# Patient Record
Sex: MALE | Race: WHITE | NOT HISPANIC OR LATINO | Employment: FULL TIME | URBAN - NONMETROPOLITAN AREA
[De-identification: names, ages, dates, MRNs, and addresses within clinical notes are randomized per-mention and may not be internally consistent; named-entity substitution may affect disease eponyms.]

---

## 2019-01-07 ENCOUNTER — OFFICE VISIT (OUTPATIENT)
Dept: INTERNAL MEDICINE | Facility: CLINIC | Age: 34
End: 2019-01-07

## 2019-01-07 VITALS
DIASTOLIC BLOOD PRESSURE: 86 MMHG | TEMPERATURE: 97.8 F | HEART RATE: 101 BPM | HEIGHT: 67 IN | WEIGHT: 224 LBS | BODY MASS INDEX: 35.16 KG/M2 | SYSTOLIC BLOOD PRESSURE: 120 MMHG | OXYGEN SATURATION: 99 % | RESPIRATION RATE: 15 BRPM

## 2019-01-07 DIAGNOSIS — G89.29 CHRONIC LEFT SHOULDER PAIN: Primary | ICD-10-CM

## 2019-01-07 DIAGNOSIS — M62.838 MUSCLE SPASM: ICD-10-CM

## 2019-01-07 DIAGNOSIS — R20.2 NUMBNESS AND TINGLING IN LEFT ARM: ICD-10-CM

## 2019-01-07 DIAGNOSIS — M25.512 CHRONIC LEFT SHOULDER PAIN: Primary | ICD-10-CM

## 2019-01-07 DIAGNOSIS — M25.619 LIMITED RANGE OF MOTION (ROM) OF SHOULDER: ICD-10-CM

## 2019-01-07 DIAGNOSIS — R20.0 NUMBNESS AND TINGLING IN LEFT ARM: ICD-10-CM

## 2019-01-07 DIAGNOSIS — Q74.0 ANOMALY OF CLAVICLE: ICD-10-CM

## 2019-01-07 PROCEDURE — 99214 OFFICE O/P EST MOD 30 MIN: CPT | Performed by: NURSE PRACTITIONER

## 2019-01-07 RX ORDER — METHOCARBAMOL 500 MG/1
500 TABLET, FILM COATED ORAL NIGHTLY PRN
Qty: 20 TABLET | Refills: 0 | Status: SHIPPED | OUTPATIENT
Start: 2019-01-07 | End: 2019-03-21

## 2019-01-07 NOTE — PROGRESS NOTES
Chief Complaint / Reason:      Chief Complaint   Patient presents with   • Shoulder Pain     since October.        Subjective     HPI  Patient presents today with complaints of left shoulder pain which has been going on since October.  He has had prior x-ray and it was noted he had mild elevation of clavicle.  X-ray was performed on 12/27/18.  Patient does have occasional numbness and tingling.  History taken from: patient    PMH/FH/Social History were reviewed and updated appropriately in the electronic medical record.     Review of Systems:   Review of Systems   Constitutional: Positive for fatigue.   Respiratory: Negative.    Cardiovascular: Negative.    Gastrointestinal: Negative.    Musculoskeletal: Positive for arthralgias, myalgias, neck pain and neck stiffness.        Shoulder pain   Neurological: Positive for weakness and numbness (And tingling).   Psychiatric/Behavioral: Positive for sleep disturbance.     All other systems were reviewed and are negative.  Exceptions are noted in the subjective or above.      Objective     Vital Signs  Vitals:    01/07/19 1548   BP: 120/86   Pulse: 101   Resp: 15   Temp: 97.8 °F (36.6 °C)   SpO2: 99%       Body mass index is 35.08 kg/m².    Physical Exam   Constitutional: He is oriented to person, place, and time. He appears well-developed and well-nourished.   Cardiovascular: Regular rhythm, normal heart sounds and intact distal pulses. Tachycardia present.   Pulmonary/Chest: Effort normal and breath sounds normal. He exhibits no tenderness.   Abdominal: Soft. Bowel sounds are normal.   Musculoskeletal: He exhibits tenderness.        Left shoulder: He exhibits decreased range of motion, tenderness, bony tenderness, pain and spasm. He exhibits normal pulse and normal strength.   Pain and limited ROM in left upper extremity with abduction.  Pain elicited with cervical flexion and lateral flexion to the right.     Neurological: He is alert and oriented to person, place,  and time.   Skin: Skin is warm and dry.   Psychiatric: He has a normal mood and affect. His behavior is normal. Judgment and thought content normal.   Nursing note and vitals reviewed.       Results Review:    I reviewed the patient's previous clinical results.       Medication Review:     Current Outpatient Medications:   •  fluticasone (FLONASE) 50 MCG/ACT nasal spray, 2 sprays into the nostril(s) as directed by provider Daily., Disp: , Rfl:   •  methocarbamol (ROBAXIN) 500 MG tablet, Take 1 tablet by mouth At Night As Needed for Muscle Spasms., Disp: 20 tablet, Rfl: 0    Assessment/Plan   Khang was seen today for shoulder pain.    Diagnoses and all orders for this visit:    Chronic left shoulder pain  -     MRI Shoulder Left Without Contrast  Discussed nonpharmacological interventions to help relieve pain.  May need PT or orthopedic referral  Numbness and tingling in left arm  -     MRI Shoulder Left Without Contrast    Limited range of motion (ROM) of shoulder  -     MRI Shoulder Left Without Contrast    Anomaly of clavicle  -     MRI Shoulder Left Without Contrast    Muscle spasm  -     MRI Shoulder Left Without Contrast  -     methocarbamol (ROBAXIN) 500 MG tablet; Take 1 tablet by mouth At Night As Needed for Muscle Spasms.        Return if symptoms worsen or fail to improve.    Corry Green, APRN  01/07/2019

## 2019-01-18 ENCOUNTER — HOSPITAL ENCOUNTER (OUTPATIENT)
Dept: MRI IMAGING | Facility: HOSPITAL | Age: 34
Discharge: HOME | End: 2019-01-18
Admitting: NURSE PRACTITIONER

## 2019-01-18 PROCEDURE — 73221 MRI JOINT UPR EXTREM W/O DYE: CPT

## 2019-01-21 ENCOUNTER — TELEPHONE (OUTPATIENT)
Dept: INTERNAL MEDICINE | Facility: CLINIC | Age: 34
End: 2019-01-21

## 2019-01-21 DIAGNOSIS — M75.111 PARTIAL TEAR OF RIGHT ROTATOR CUFF: Primary | ICD-10-CM

## 2019-01-21 DIAGNOSIS — M67.813 TENDINOSIS OF RIGHT SHOULDER: ICD-10-CM

## 2019-01-21 NOTE — TELEPHONE ENCOUNTER
Patient let vm at 8:26am stating that he had missed a call from Manisha regarding getting scheduled with Ortho and he is requesting another return call. Please advise. Confirmed call back is [phone]

## 2019-01-21 NOTE — PROGRESS NOTES
Please contact patient and let him know results and tell him I recommend referral to ortho. I sent him a letter with the report.

## 2019-01-22 ENCOUNTER — OFFICE VISIT (OUTPATIENT)
Dept: ORTHOPEDIC SURGERY | Facility: CLINIC | Age: 34
End: 2019-01-22

## 2019-01-22 VITALS — RESPIRATION RATE: 18 BRPM | HEIGHT: 67 IN | WEIGHT: 217 LBS | BODY MASS INDEX: 34.06 KG/M2

## 2019-01-22 DIAGNOSIS — IMO0002 BURSITIS/TENDONITIS, SHOULDER: ICD-10-CM

## 2019-01-22 DIAGNOSIS — M25.511 ARTHRALGIA OF RIGHT SHOULDER REGION: Primary | ICD-10-CM

## 2019-01-22 DIAGNOSIS — M54.12 CERVICAL RADICULOPATHY AT C6: ICD-10-CM

## 2019-01-22 PROCEDURE — 20610 DRAIN/INJ JOINT/BURSA W/O US: CPT | Performed by: ORTHOPAEDIC SURGERY

## 2019-01-22 PROCEDURE — 99204 OFFICE O/P NEW MOD 45 MIN: CPT | Performed by: ORTHOPAEDIC SURGERY

## 2019-01-22 RX ORDER — TRIAMCINOLONE ACETONIDE 40 MG/ML
40 INJECTION, SUSPENSION INTRA-ARTICULAR; INTRAMUSCULAR
Status: COMPLETED | OUTPATIENT
Start: 2019-01-22 | End: 2019-01-22

## 2019-01-22 RX ORDER — MELOXICAM 15 MG/1
15 TABLET ORAL DAILY
Qty: 30 TABLET | Refills: 1 | Status: SHIPPED | OUTPATIENT
Start: 2019-01-22 | End: 2019-08-12

## 2019-01-22 RX ORDER — LIDOCAINE HYDROCHLORIDE 10 MG/ML
2 INJECTION, SOLUTION EPIDURAL; INFILTRATION; INTRACAUDAL; PERINEURAL
Status: COMPLETED | OUTPATIENT
Start: 2019-01-22 | End: 2019-01-22

## 2019-01-22 RX ADMIN — TRIAMCINOLONE ACETONIDE 40 MG: 40 INJECTION, SUSPENSION INTRA-ARTICULAR; INTRAMUSCULAR at 11:18

## 2019-01-22 RX ADMIN — LIDOCAINE HYDROCHLORIDE 2 ML: 10 INJECTION, SOLUTION EPIDURAL; INFILTRATION; INTRACAUDAL; PERINEURAL at 11:18

## 2019-01-22 NOTE — PROGRESS NOTES
Subjective   Patient ID: Khang Caruso is a 33 y.o. male  Pain of the Left Shoulder (Patient denies any injury to the shoulder, he states he woke up in October 2018 with pain and the severity has just increased. He states he has had 2 rounds of a medrol dose jacques and still having pain. 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  X-rays of the neck and shoulder were unremarkable, MRI of the shoulder confirm partial tearing supraspinatus with a type III acromion and impingement, MRI of the cervical spine was denied for that reason he is referred here.    Gives  no history of shoulder trauma dislocation throwing injury or prior instability.    At times pain does radiate down the elbow laterally and even into the forearm and wrist area with soreness in the hand even without moving her shoulder.    Initial stiffness in the neck has improved but neck spasm and guarding and tightness continues.    Review of Systems   Constitutional: Negative for fever.   HENT: Negative for voice change.    Eyes: Negative for visual disturbance.   Respiratory: Negative for shortness of breath.    Cardiovascular: Negative for chest pain.   Gastrointestinal: Negative for abdominal distention and abdominal pain.   Genitourinary: Negative for dysuria.   Musculoskeletal: Positive for arthralgias. Negative for gait problem and joint swelling.   Skin: Negative for rash.   Neurological: Negative for speech difficulty.   Hematological: Does not bruise/bleed easily.   Psychiatric/Behavioral: Negative for confusion.       History reviewed. No pertinent past medical history.     Past Surgical History:  "  Procedure Laterality Date   • NASAL TURBINATE REDUCTION         Family History   Problem Relation Age of Onset   • Arthritis Mother    • Heart disease Maternal Grandfather    • Diabetes Paternal Grandmother        Social History     Socioeconomic History   • Marital status:      Spouse name: Not on file   • Number of children: Not on file   • Years of education: Not on file   • Highest education level: Not on file   Social Needs   • Financial resource strain: Not on file   • Food insecurity - worry: Not on file   • Food insecurity - inability: Not on file   • Transportation needs - medical: Not on file   • Transportation needs - non-medical: Not on file   Occupational History   • Not on file   Tobacco Use   • Smoking status: Never Smoker   • Smokeless tobacco: Never Used   Substance and Sexual Activity   • Alcohol use: No   • Drug use: No   • Sexual activity: Defer   Other Topics Concern   • Not on file   Social History Narrative   • Not on file       I have reviewed all of the above social hx, family hx, surgical hx, medications, allergies & ROS and confirm that it is accurate.    No Known Allergies      Current Outpatient Medications:   •  fluticasone (FLONASE) 50 MCG/ACT nasal spray, 2 sprays into the nostril(s) as directed by provider Daily., Disp: , Rfl:   •  methocarbamol (ROBAXIN) 500 MG tablet, Take 1 tablet by mouth At Night As Needed for Muscle Spasms., Disp: 20 tablet, Rfl: 0    Objective   Resp 18   Ht 170.2 cm (67\")   Wt 98.4 kg (217 lb)   BMI 33.99 kg/m²    Physical Exam  Constitutional: Patient is oriented to person, place, and time. Patient appears well-developed and well-nourished.   HENT:Head: Normocephalic and atraumatic.   Eyes: EOM are normal. Pupils are equal, round, and reactive to light.   Neck: Normal range of motion. Neck supple.   Cardiovascular: Normal rate.    Pulmonary/Chest: Effort normal and breath sounds normal.   Abdominal: Soft.   Neurological: Patient is alert and " oriented to person, place, and time.   Skin: Skin is warm and dry.   Psychiatric: Patient has a normal mood and affect.   Nursing note and vitals reviewed.       [unfilled]   Cervical spine with limited less than rotation extension, positive Spurling finding for reduction of lateral left arm pain and burning with extension left-sided rotation, left trapezius tender with spasm, acromio- clavicular joint nontender.    Positive impingement sign positive tenderness subacromial bursa and anterolateral acromial region, no tenderness proximal biceps tendon, forward elevation limited in 90° with pain, abduction limited in 90° with pain full internal/external rotation normal strength    Assessment/Plan   Review of Radiographic Studies:    Radiographic images today of affected area I personally viewed and showed no sign of acute fracture or dislocation.      Large Joint Arthrocentesis: L subacromial bursa  Date/Time: 1/22/2019 11:18 AM  Consent given by: patient  Site marked: site marked  Timeout: Immediately prior to procedure a time out was called to verify the correct patient, procedure, equipment, support staff and site/side marked as required   Supporting Documentation  Indications: pain   Procedure Details  Location: shoulder - L subacromial bursa  Preparation: Patient was prepped and draped in the usual sterile fashion  Needle size: 22 G  Medications administered: 2 mL lidocaine PF 1% 1 %; 40 mg triamcinolone acetonide 40 MG/ML  Patient tolerance: patient tolerated the procedure well with no immediate complications           Khnag was seen today for pain.    Diagnoses and all orders for this visit:    Arthralgia of right shoulder region  -     XR Shoulder 2+ View Right    Bursitis/tendonitis, shoulder    Cervical radiculopathy at C6  -     MRI Cervical Spine Without Contrast       Physical therapy referral given      Recommendations/Plan:   Exercise, medications, injections, other patient advice, and return  appointment as noted.    Patient agreeable to call or return sooner for any concerns.             Impression:  Left nondominant shoulder impingement type III acromion partial tearing supraspinatus, left-sided neck pain and arm pain cervical radiculopathy rule out cervical disc herniation  Plan:  Therapy referral to treat both cervical and shoulder regions, meloxicam trial,  MR cervical spine,

## 2019-01-23 ENCOUNTER — HOSPITAL ENCOUNTER (OUTPATIENT)
Dept: MRI IMAGING | Facility: HOSPITAL | Age: 34
Discharge: HOME | End: 2019-01-23
Attending: ORTHOPAEDIC SURGERY | Admitting: ORTHOPAEDIC SURGERY

## 2019-01-23 PROCEDURE — 72141 MRI NECK SPINE W/O DYE: CPT

## 2019-02-26 ENCOUNTER — OFFICE VISIT (OUTPATIENT)
Dept: ORTHOPEDIC SURGERY | Facility: CLINIC | Age: 34
End: 2019-02-26

## 2019-02-26 VITALS — BODY MASS INDEX: 31.23 KG/M2 | WEIGHT: 199 LBS | RESPIRATION RATE: 18 BRPM | HEIGHT: 67 IN

## 2019-02-26 DIAGNOSIS — M54.12 CERVICAL RADICULOPATHY: ICD-10-CM

## 2019-02-26 DIAGNOSIS — IMO0002 BURSITIS/TENDONITIS, SHOULDER: Primary | ICD-10-CM

## 2019-02-26 PROCEDURE — 99214 OFFICE O/P EST MOD 30 MIN: CPT | Performed by: ORTHOPAEDIC SURGERY

## 2019-02-26 NOTE — PROGRESS NOTES
Subjective   Patient ID: Khang Caruso is a 33 y.o. male  Follow-up and Pain of the Cervical Spine (Patient is here today for MRI results on his left shoulder and c-spine. He state the shoulder is doing better but certain movements like to much extension cause pain.) and Follow-up and Pain of the Left Shoulder             History of Present Illness  Right-hand-dominant  much improved left shoulder pain still bothers her more with axial neck pain and stiffness in the neck with left-sided rotation and extension recent MR showed C3-4 C4-5 bulging disks with foraminal encroachment, denies paresthesias in the lower arms or weakness in the hands.  He is very comfortable with range of motion of the shoulder today and does not wish to proceed with surgical treatment for  shoulder condition at this time.      Review of Systems   Constitutional: Negative for fever.   HENT: Negative for voice change.    Eyes: Negative for visual disturbance.   Respiratory: Negative for shortness of breath.    Cardiovascular: Negative for chest pain.   Gastrointestinal: Negative for abdominal distention and abdominal pain.   Genitourinary: Negative for dysuria.   Musculoskeletal: Positive for arthralgias. Negative for gait problem and joint swelling.   Skin: Negative for rash.   Neurological: Negative for speech difficulty.   Hematological: Does not bruise/bleed easily.   Psychiatric/Behavioral: Negative for confusion.       History reviewed. No pertinent past medical history.     Past Surgical History:   Procedure Laterality Date   • NASAL TURBINATE REDUCTION         Family History   Problem Relation Age of Onset   • Arthritis Mother    • Heart disease Maternal Grandfather    • Diabetes Paternal Grandmother        Social History     Socioeconomic History   • Marital status:      Spouse name: Not on file   • Number of children: Not on file   • Years of education: Not on file   • Highest education level: Not on file  "  Social Needs   • Financial resource strain: Not on file   • Food insecurity - worry: Not on file   • Food insecurity - inability: Not on file   • Transportation needs - medical: Not on file   • Transportation needs - non-medical: Not on file   Occupational History   • Not on file   Tobacco Use   • Smoking status: Never Smoker   • Smokeless tobacco: Never Used   Substance and Sexual Activity   • Alcohol use: No   • Drug use: No   • Sexual activity: Defer   Other Topics Concern   • Not on file   Social History Narrative   • Not on file       I have reviewed all of the above social hx, family hx, surgical hx, medications, allergies & ROS and confirm that it is accurate.    No Known Allergies      Current Outpatient Medications:   •  fluticasone (FLONASE) 50 MCG/ACT nasal spray, 2 sprays into the nostril(s) as directed by provider Daily., Disp: , Rfl:   •  meloxicam (MOBIC) 15 MG tablet, Take 1 tablet by mouth Daily., Disp: 30 tablet, Rfl: 1  •  methocarbamol (ROBAXIN) 500 MG tablet, Take 1 tablet by mouth At Night As Needed for Muscle Spasms., Disp: 20 tablet, Rfl: 0    Objective   Resp 18   Ht 170.2 cm (67\")   Wt 90.3 kg (199 lb)   BMI 31.17 kg/m²    Physical Exam  Constitutional: Patient is oriented to person, place, and time. Patient appears well-developed and well-nourished.   HENT:Head: Normocephalic and atraumatic.   Eyes: EOM are normal. Pupils are equal, round, and reactive to light.   Neck: Normal range of motion. Neck supple.   Cardiovascular: Normal rate.    Pulmonary/Chest: Effort normal and breath sounds normal.   Abdominal: Soft.   Neurological: Patient is alert and oriented to person, place, and time.   Skin: Skin is warm and dry.   Psychiatric: Patient has a normal mood and affect.   Nursing note and vitals reviewed.       [unfilled]   Cervical spine: Axial spasm and guarding with left-sided rotation extension painful and positive for neck pain but negative for lower arm paresthesias some " tenderness in the paracervical spine noted.    Left shoulder: Full range of motion minimal signs of impingement no weakness no instability atrophy or focal tenderness at the acromial clavicular or anterior biceps tendon.    Assessment/Plan   Review of Radiographic Studies:    No new imaging done today.      Procedures     Khang was seen today for follow-up, pain, follow-up and pain.    Diagnoses and all orders for this visit:    Bursitis/tendonitis, shoulder    Cervical radiculopathy       Orthopedic activities reviewed and patient expressed appreciation      Recommendations/Plan:   Work/Activity Status: May perform usual activities as tolerated    Patient agreeable to call or return sooner for any concerns.         Reviewed his MR cervical spine showing C3-4 and C4-5 bulging disks with slight bilateral neuroforaminal encroachment and reviewed his MR shoulder showing mild impingement with partial cuff tearing.  Given his minimal symptoms in the shoulder and predominance of axial cervical symptoms further evaluation and treatment will be arranged with neurosurgery to discuss pain management and treatment for his cervical pathology.      If his shoulder condition worsens in the future he is a candidate for arthroscopic surgery for decompression and further arthroscopic evaluation of his partial rotator cuff tear.    Impression:  C3-4, C4-5 bulging disks of the left-sided neck pain loss of cervical motion, impingement syndrome left shoulder much improved partial tearing full range of motion  Plan:  Neurosurgery consult for further treatment for cervical spine, orthopedic follow-up in 4-6 months if left shoulder pain continues for discussion for arthroscopic treatment

## 2019-03-12 ENCOUNTER — OFFICE VISIT (OUTPATIENT)
Dept: NEUROSURGERY | Facility: CLINIC | Age: 34
End: 2019-03-12

## 2019-03-12 VITALS — HEIGHT: 67 IN | BODY MASS INDEX: 31.23 KG/M2 | WEIGHT: 199 LBS

## 2019-03-12 DIAGNOSIS — M50.30 BULGING OF CERVICAL INTERVERTEBRAL DISC: Primary | ICD-10-CM

## 2019-03-12 PROCEDURE — 99243 OFF/OP CNSLTJ NEW/EST LOW 30: CPT | Performed by: NEUROLOGICAL SURGERY

## 2019-03-12 NOTE — PROGRESS NOTES
Subjective   Patient ID: Khang Caruso is a 34 y.o. 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  Pain has radiated to his left arm and hand.  He saw Dr. Mccauley about his left shoulder and had an injection in his shoulder as well as physical therapy and the shoulder pain is much better.  Unfortunately he still having the neck pain and stiffness, a nonradicular type of pain, that is worse with sleeping in certain positions or stretching.  He has not had to be off from work.    Review of Radiographic Studies:  Cervical MRI scan was done on 1/23/2019 and shows a degenerative bilateral disc bulge at C3-4 as the primary issue, and a lesser degree of the same findings C4-5.  There is no evidence of nerve root compression causing a radiculopathy, instability, or    The following portions of the patient's history were reviewed, updated as appropriate and approved: allergies, current medications, past family history, past medical history, past social history, past surgical history, review of systems and problem list.  Review of Systems   Constitutional: Negative for activity change, appetite change, chills, diaphoresis, fatigue, fever and unexpected weight change.   HENT: Negative for congestion, dental problem, drooling, ear discharge, ear pain, facial swelling, hearing loss, mouth sores, nosebleeds, postnasal drip, rhinorrhea, sinus pressure, sneezing, sore throat, tinnitus, trouble swallowing and voice change.    Eyes: Negative for photophobia, pain, discharge, redness, itching and visual disturbance.   Respiratory: Negative for apnea, cough, choking, chest tightness, shortness of breath, wheezing and stridor.     Cardiovascular: Negative for chest pain, palpitations and leg swelling.   Gastrointestinal: Negative for abdominal distention, abdominal pain, anal bleeding, blood in stool, constipation, diarrhea, nausea, rectal pain and vomiting.   Endocrine: Negative for cold intolerance, heat intolerance, polydipsia, polyphagia and polyuria.   Genitourinary: Negative for decreased urine volume, difficulty urinating, dysuria, enuresis, flank pain, frequency, genital sores, hematuria and urgency.   Musculoskeletal: Positive for arthralgias, myalgias, neck pain and neck stiffness. Negative for back pain, gait problem and joint swelling.   Skin: Negative for color change, pallor, rash and wound.   Allergic/Immunologic: Negative for environmental allergies, food allergies and immunocompromised state.   Neurological: Positive for headaches. Negative for dizziness, tremors, seizures, syncope, facial asymmetry, speech difficulty, weakness, light-headedness and numbness.   Hematological: Negative for adenopathy. Does not bruise/bleed easily.   Psychiatric/Behavioral: Negative for agitation, behavioral problems, confusion, decreased concentration, dysphoric mood, hallucinations, self-injury, sleep disturbance and suicidal ideas. The patient is not nervous/anxious and is not hyperactive.        Objective     NEUROLOGICAL EXAMINATION:      MENTAL STATUS:  Alert and oriented.  Speech intact.  Recent and remote memory intact.      CRANIAL NERVES:  Cranial nerve II:  Visual fields are full.  Cranial nerves III, IV and VI:  PERRLADC.  Extraocular movements are intact.  Nystagmus is not present.  Cranial nerve V:  Facial sensation is intact.  Cranial nerve VII:  Muscles of facial expression reveal no asymmetry.  Cranial nerve VIII:  Hearing is intact.  Cranial nerves IX and X:  Palate elevates symmetrically.  Cranial nerve XI:  Shoulder shrug is intact.  Cranial nerve XII:  Tongue is midline without evidence of atrophy or  fasciculation.    MUSCULOSKELETAL: Left shoulder range of motion intact.  Cervical range of motion intact.    MOTOR: Intact strength in the deltoid, biceps, triceps, and  bilaterally.    SENSATION: No radicular sensory loss in the upper extremities.    REFLEXES:  DTR 2+ and equal in biceps and triceps bilaterally.    Assessment   Cervical degenerative disc with cervical pain syndrome C3-4, C4-5.  No radiculopathy, myelopathy, or instability.       Plan   Cervical spine physical therapy.  Referral for cervical epidural injection, particularly since he had such success with steroid injection in his shoulder.  Surgery is not necessary.       Michael Johnson MD

## 2019-03-13 ENCOUNTER — TELEPHONE (OUTPATIENT)
Dept: PAIN MEDICINE | Facility: CLINIC | Age: 34
End: 2019-03-13

## 2019-03-13 NOTE — TELEPHONE ENCOUNTER
[facility] Report #[id number]    MRI CERVICAL SPINE 01/23/19    FINDINGS: The cervical vertebral bodies maintain a normal height,  alignment and signal intensity. The posterior elements are intact.      At the C3/4 level there is a broad-based disc bulge which produces mild  bilateral neural foraminal narrowing. There is no significant central  stenosis.     At the C4/5 level there is a broad-based disc bulge, however there is no  significant spinal or neural foraminal stenosis.      The remainder of the intervertebral disc are preserved.     The cervical portions of the spinal cord maintains a normal caliber and  signal intensity. The visualized posterior fossa is normal. The  paraspinal soft tissues are unremarkable.    XRAY SHOULDER 01/22/19    3 views left shoulder indication pain, no comparison to prior films, impression: No sign of fracture or arthritis or significant soft tissue swelling    MRI SHOULDER 01/18/19    FINDINGS:   There is tendinosis of the supraspinatus and infraspinatus without  full-thickness tear. There may be partial tear along the undersurface of  the anterior supraspinatus. Partial tear is noted of the cranial-most  fibers of the subscapularis. Teres minor is intact.     The biceps tendon follows its normal intra-articular course and is  located within the bicipital groove. No labral tear is identified.      Mild subacromial bursitis.      There is a hooked acromion, type III.      Mild edematous change of the acromioclavicular joint.

## 2019-03-19 NOTE — PROGRESS NOTES
"Chief Complaint: \"Pain in my neck and left shoulder.\"        History of Present Illness:   Patient: Mr. Khang Caruso, 34 y.o. male   Referring physician: Dr. Michael Johnson   Reason for referral: Consultation for intractable chronic neck and left shoulder pain.   Pain history: Patient reports a 5-month history of neck pain and stiffness and pain in his left shoulder, which began without incident. Pain has progressed in intensity over the past 3 months. Patient underwent consultation with Dr. Mccauley about his left shoulder, and had a left shoulder injection along with physical therapy. The shoulder pain has improved since then. Unfortunately, patient continued experiencing neck pain and stiffness. MRI of the cervical spine on 01/23/2019, revealed degenerative disc bulge at C3-C4 and to a lesser degree at C4-C5. No evidence of nerve root compression. MRI of the left shoulder on 01/18/2019, revealed tendinosis of the supraspinatus and infraspinatus without full-thickness tear. Partial tear along the undersurface of the anterior supraspinatus. Partial tear of the cranial-most fibers of the subscapularis. No labral tear was identified. Mild subacromial bursitis. There is a hooked acromion, type III. Mild edematous change of the acromioclavicular joint.  Pain description: constant pain with intermittent exacerbation, described as stiffness, burning and sharp sensation.   Radiation of pain: The pain radiates into the posterior aspect of the shoulder, and into the bilateral occipital region  Pain intensity today: 5/10  Average pain intensity last week: 5/10  Pain intensity ranges from: 3/10 to 8/10  Aggravating factors: Pain increases with flexion and extension of the cervical spine  Alleviating factors: Pain decreases with none    Associated symptoms:   Patient denies pain, numbness or weakness in the upper extremities  Patient denies any new bladder or bowel problems.   Patient denies difficulties with his balance or recent " falls.      Review of previous therapies and additional medical records:  Khang Caruso has already failed the following measures, including:   Conservative measures: analgesics and physical therapy   Interventional measures: None  Surgical measures: No history of cervical spine or shoulder surgery   Khang Caruso underwent neurosurgical consultation with Dr. Michael Johnson on 03/12/2019, and was found not to be a surgical candidate.  Khang Caruso is a healthy adult other than his chronic pain.  In terms of current analgesics, Khang Caruso takes: meloxicam or Tylenol  I have reviewed [facility] Report #[id number] consistent to medication reconciliation.     Global Pain Scale 03-21 2019                 Pain  14                 Feelings  4                 Clinical outcomes  7                 Activities  6                 GPS Total:  31                    Review of Diagnostic Studies:    MRI CERVICAL SPINE 01/23/2019: The cervical vertebral bodies maintain a normal height, alignment and signal intensity. The posterior elements are intact. The remainder of the intervertebral disc are preserved. The cervical portions of the spinal cord maintains a normal caliber and signal intensity. The visualized posterior fossa is normal. The paraspinal soft tissues are unremarkable.  C3-C4: broad-based disc bulge which produces mild bilateral neural foraminal narrowing. There is no significant central stenosis.  C4-C5: broad-based disc bulge, however there is no significant spinal or neural foraminal stenosis.   X-RAY SHOULDER 01/22/2019: 3 views left shoulder indication pain, no comparison to prior films, impression: No sign of fracture or arthritis or significant soft tissue swelling  MRI SHOULDER 01/18/19: There is tendinosis of the supraspinatus and infraspinatus without full-thickness tear. Partial tear along the undersurface of the anterior supraspinatus. Partial tear is noted of the cranial-most fibers of the subscapularis.  "Teres minor is intact. The biceps tendon follows its normal intra-articular course and is located within the bicipital groove. No labral tear is identified. Mild subacromial bursitis. There is a hooked acromion, type III. Mild edematous change of the acromioclavicular joint.    Review of Systems   HENT: Positive for congestion.    Musculoskeletal: Positive for back pain, neck pain and neck stiffness.   All other systems reviewed and are negative.        Patient Active Problem List   Diagnosis   • Back pain   • Bulging of cervical intervertebral disc   • DDD (degenerative disc disease), cervical   • Foraminal stenosis of cervical region   • Incomplete tear of left rotator cuff   • Cervical spondylosis without myelopathy   • Myofascial pain   • Mild obesity   • Insomnia     History reviewed. No pertinent past medical history.      Past Surgical History:   Procedure Laterality Date   • NASAL TURBINATE REDUCTION           Family History   Problem Relation Age of Onset   • Arthritis Mother    • Heart disease Maternal Grandfather    • Diabetes Paternal Grandmother          Social History     Socioeconomic History   • Marital status:      Spouse name: Not on file   • Number of children: Not on file   • Years of education: Not on file   • Highest education level: Not on file   Tobacco Use   • Smoking status: Never Smoker   • Smokeless tobacco: Never Used   Substance and Sexual Activity   • Alcohol use: No   • Drug use: No   • Sexual activity: Defer           Current Outpatient Medications:   •  fluticasone (FLONASE) 50 MCG/ACT nasal spray, 2 sprays into the nostril(s) as directed by provider Daily., Disp: , Rfl:   •  meloxicam (MOBIC) 15 MG tablet, Take 1 tablet by mouth Daily., Disp: 30 tablet, Rfl: 1      No Known Allergies      /80   Pulse 77   Temp 96.8 °F (36 °C) (Temporal)   Resp 18   Ht 170.2 cm (67\")   Wt 92.9 kg (204 lb 12.8 oz)   SpO2 98%   BMI 32.08 kg/m²       Physical Exam:  Constitutional: " Patient is oriented to person, place, and time. Patient appears well-developed and well-nourished.   HEENT: Head: Normocephalic and atraumatic. Eyes: Conjunctivae and lids are normal. Pupils: Equal, round, reactive to light.   Neck: Trachea normal. Neck supple. No JVD present.   Lymphatic: No cervical adenopathy  Pulmonary Respiratory effort: No increased work of breathing or signs of respiratory distress. Auscultation of lungs: Clear to auscultation.   Cardiovascular Auscultation of heart: Normal rate and rhythm, normal S1 and S2, no murmurs.   Musculoskeletal   Gait and station: Gait evaluation demonstrated a normal gait   Cervical spine: Passive and active range of motion are limited secondary to pain. Flexion, lateral flexion, and rotation of the cervical spine increased and reproduced pain. Cervical facet joint loading maneuvers are positive.  Muscles: Presence of active trigger points at the levator scapulae   Shoulders: The range of motion of the glenohumeral joints is full and without pain. Rotator cuff strength is 5/5, except left supraspinatus 4+/5.   Neurological: Patient is alert and oriented to person, place, and time. Speech: speech is normal. Cortical function: Normal mental status.   Cranial nerves: Cranial nerves 2-12 intact.   Reflex Scores:  Right brachioradialis: 2+  Left brachioradialis: 2+  Right biceps: 2+  Left biceps: 2+  Right triceps: 2+  Left triceps: 2+  Right patellar: 2+  Left patellar: 2+  Right Achilles: 2+  Left Achilles: 2+  Motor strength: 5/5  Motor Tone: normal tone.   Involuntary movements: none.   Superficial/Primitive Reflexes: primitive reflexes were absent.   Right Diaz: absent  Left Diaz: absent  Right ankle clonus: absent  Left ankle clonus: absent   Spurling sign is negative. Neck tornado test is negative. Lhermitte sign is negative. Negative long tract signs.   Sensation: No sensory loss. Sensory exam: intact to light touch, intact pain and temperature  sensation, intact vibration sensation and normal proprioception.   Coordination: Normal finger to nose and heel to shin. Normal balance and negative Romberg's sign   Skin and subcutaneous tissue: Skin is warm and intact. No rash noted. No cyanosis.   Psychiatric: Judgment and insight: Normal. Orientation to person, place and time: Normal. Recent and remote memory: Intact. Mood and affect: Normal.     ASSESSMENT:   1. Cervical spondylosis without myelopathy    2. DDD (degenerative disc disease), cervical    3. Foraminal stenosis of cervical region    4. Incomplete tear of left rotator cuff    5. Myofascial pain    6. Mild obesity    7. Insomnia, unspecified type        PLAN/MEDICAL DECISION MAKING: I had a lengthy conversation with . Khang Caruso regarding his chronic pain condition and potential therapeutic options including risks, benefits, alternative therapies, to name a few. Patient has failed to obtain pain relief with conservative measures, as referenced above. Patient has been found not to be a surgical candidate for his cervical spine condition. I have reviewed all available patient's medical records as well as previous therapies as referenced above.  Therefore, I have proposed the following plan:  1. Diagnostic studies: CBC with differential, ESR, CRP, Cyclic anticitrulinated peptide, LINNETTE panel, Rheumatoid factor, HLA-B27, CMP, and Uric acid  2. Pharmacological measures: Reviewed. Discussed.   A. Patient takes meloxicam or Tylenol  B. Trial with Rheumate one tablet twice daily (samples)  C. Trial with nortriptyline 10 mg 1-2 tablets at bedtime  D. Trial with prilocaine 2%, lidocaine 10%, imipramine 3%, capsaicin 0.001% and mannitol 20%  cream, apply 1 to 2 grams of cream to the affected areas every 4 to 6 hours as needed  3. Interventional pain management measures: Patient will be scheduled for diagnostic and therapeutic cervical facet joint injections: bilateral C4-C5, bilateral C5-C6. If patient  fails to obtain sustained pain relief, then, we will proceed with diagnostic bilateral cervical medial branch blocks at C4, C5, C6; for bilateral cervical facet joints at C4-C5, C5-C6, to clarify the origin of chronic refractory mechanical/axial cervicalgia. If patient experiences more than 80% pain relief along with significant improvement in the range of motion of the cervical spine, then, patient will be scheduled for a second set of diagnostic bilateral cervical medial branch blocks, to then, proceed with bilateral cervical medial branch rhizotomies. Otherwise, we may consider cervical epidural steroid injection by interlaminar approach. We may repeat another epidural depending on patient's outcome.   4. Long-term rehabilitation efforts:  A. The patient does not have a history of falls. I did complete a risk assessment for falls.   B. Patient will start a comprehensive physical therapy program for therapeutic exercise, upper body strengthening/posture correction, neurodynamics, myofascial release, cupping and dry needling   C. Start an exercise program such as yoga, swimming and daily walks for fitness  D. Trial with TENS unit/interferential unit  E. Contrast therapy: Apply ice-packs for 15-20 minutes, followed by heating pads for 15-20 minutes to affected area   F. Patient has declined a referral to [facility] at this time  5. The patient has been instructed to contact my office with any questions or difficulties. The patient understands the plan and agrees to proceed accordingly.       Patient Care Team:  Corry Green APRN as PCP - General (Nurse Practitioner)  Andrea Mccauley MD as Consulting Physician (Orthopedic Surgery)  Jose Reed MD as Consulting Physician (Otolaryngology)  Michael Johnson MD as Consulting Physician (Neurosurgery)     No orders of the defined types were placed in this encounter.        No future appointments.      Jerry Kincaid MD      EMR Dragon/Transcription disclaimer:  Much of this encounter note is an electronic transcription of spoken language to printed text. Electronic transcription of spoken language may permit erroneous, or at times, nonsensical words or phrases to be inadvertently transcribed. Although I have reviewed the note for such errors, some may still exist.

## 2019-03-20 PROBLEM — M48.02 FORAMINAL STENOSIS OF CERVICAL REGION: Status: ACTIVE | Noted: 2019-03-20

## 2019-03-20 PROBLEM — M50.30 DDD (DEGENERATIVE DISC DISEASE), CERVICAL: Status: ACTIVE | Noted: 2019-03-20

## 2019-03-20 PROBLEM — M75.112 INCOMPLETE TEAR OF LEFT ROTATOR CUFF: Status: ACTIVE | Noted: 2019-03-20

## 2019-03-21 ENCOUNTER — OFFICE VISIT (OUTPATIENT)
Dept: PAIN MEDICINE | Facility: CLINIC | Age: 34
End: 2019-03-21

## 2019-03-21 VITALS
DIASTOLIC BLOOD PRESSURE: 80 MMHG | RESPIRATION RATE: 18 BRPM | SYSTOLIC BLOOD PRESSURE: 110 MMHG | BODY MASS INDEX: 32.15 KG/M2 | HEART RATE: 77 BPM | WEIGHT: 204.8 LBS | TEMPERATURE: 96.8 F | OXYGEN SATURATION: 98 % | HEIGHT: 67 IN

## 2019-03-21 DIAGNOSIS — M79.18 MYOFASCIAL PAIN: ICD-10-CM

## 2019-03-21 DIAGNOSIS — M75.112 INCOMPLETE TEAR OF LEFT ROTATOR CUFF: ICD-10-CM

## 2019-03-21 DIAGNOSIS — G47.00 INSOMNIA, UNSPECIFIED TYPE: ICD-10-CM

## 2019-03-21 DIAGNOSIS — M47.812 CERVICAL SPONDYLOSIS WITHOUT MYELOPATHY: ICD-10-CM

## 2019-03-21 DIAGNOSIS — M50.30 DDD (DEGENERATIVE DISC DISEASE), CERVICAL: ICD-10-CM

## 2019-03-21 DIAGNOSIS — M47.812 CERVICAL SPONDYLOSIS WITHOUT MYELOPATHY: Primary | ICD-10-CM

## 2019-03-21 DIAGNOSIS — E66.9 MILD OBESITY: ICD-10-CM

## 2019-03-21 DIAGNOSIS — M48.02 FORAMINAL STENOSIS OF CERVICAL REGION: ICD-10-CM

## 2019-03-21 PROCEDURE — 99204 OFFICE O/P NEW MOD 45 MIN: CPT | Performed by: ANESTHESIOLOGY

## 2019-03-21 RX ORDER — ME-TETRAHYDROFOLATE/B12/HRB236 1-1-500 MG
CAPSULE ORAL
Qty: 180 CAPSULE | Refills: 1 | Status: SHIPPED | OUTPATIENT
Start: 2019-03-21 | End: 2019-05-29

## 2019-03-21 RX ORDER — NORTRIPTYLINE HYDROCHLORIDE 10 MG/1
CAPSULE ORAL
Qty: 60 CAPSULE | Refills: 5 | Status: SHIPPED | OUTPATIENT
Start: 2019-03-21 | End: 2019-05-29

## 2019-03-26 LAB
ALBUMIN SERPL-MCNC: 4.8 G/DL (ref 3.5–5)
ALBUMIN/GLOB SERPL: 1.8 G/DL (ref 1–2)
ALP SERPL-CCNC: 45 U/L (ref 38–126)
ALT SERPL-CCNC: 39 U/L (ref 13–69)
AST SERPL-CCNC: 21 U/L (ref 15–46)
BASOPHILS # BLD AUTO: 0.05 10*3/MM3 (ref 0–0.2)
BASOPHILS NFR BLD AUTO: 0.6 % (ref 0–2.5)
BILIRUB SERPL-MCNC: 0.7 MG/DL (ref 0.2–1.3)
BUN SERPL-MCNC: 28 MG/DL (ref 7–20)
BUN/CREAT SERPL: 28 (ref 6.3–21.9)
CALCIUM SERPL-MCNC: 10.4 MG/DL (ref 8.4–10.2)
CCP IGA+IGG SERPL IA-ACNC: 6 UNITS (ref 0–19)
CENTROMERE B AB SER-ACNC: <0.2 AI (ref 0–0.9)
CHLORIDE SERPL-SCNC: 103 MMOL/L (ref 98–107)
CHROMATIN AB SERPL-ACNC: <0.2 AI (ref 0–0.9)
CO2 SERPL-SCNC: 25 MMOL/L (ref 26–30)
CREAT SERPL-MCNC: 1 MG/DL (ref 0.6–1.3)
CRP SERPL-MCNC: <0.5 MG/DL (ref 0–1)
DSDNA AB SER-ACNC: <1 IU/ML (ref 0–9)
ENA JO1 AB SER-ACNC: <0.2 AI (ref 0–0.9)
ENA RNP AB SER-ACNC: <0.2 AI (ref 0–0.9)
ENA SCL70 AB SER-ACNC: <0.2 AI (ref 0–0.9)
ENA SM AB SER-ACNC: <0.2 AI (ref 0–0.9)
ENA SS-A AB SER-ACNC: <0.2 AI (ref 0–0.9)
ENA SS-B AB SER-ACNC: <0.2 AI (ref 0–0.9)
EOSINOPHIL # BLD AUTO: 0.09 10*3/MM3 (ref 0–0.7)
EOSINOPHIL NFR BLD AUTO: 1.2 % (ref 0–7)
ERYTHROCYTE [DISTWIDTH] IN BLOOD BY AUTOMATED COUNT: 12 % (ref 11.5–14.5)
ERYTHROCYTE [SEDIMENTATION RATE] IN BLOOD BY WESTERGREN METHOD: 3 MM/HR (ref 0–15)
GLOBULIN SER CALC-MCNC: 2.7 GM/DL
GLUCOSE SERPL-MCNC: 83 MG/DL (ref 74–98)
HCT VFR BLD AUTO: 44.9 % (ref 42–52)
HGB BLD-MCNC: 15.3 G/DL (ref 14–18)
HLA-B27 QL NAA+PROBE: NEGATIVE
IMM GRANULOCYTES # BLD AUTO: 0.07 10*3/MM3 (ref 0–0.06)
IMM GRANULOCYTES NFR BLD AUTO: 0.9 % (ref 0–0.6)
LYMPHOCYTES # BLD AUTO: 2.54 10*3/MM3 (ref 0.6–3.4)
LYMPHOCYTES NFR BLD AUTO: 32.5 % (ref 10–50)
Lab: NORMAL
MCH RBC QN AUTO: 29 PG (ref 27–31)
MCHC RBC AUTO-ENTMCNC: 34.1 G/DL (ref 30–37)
MCV RBC AUTO: 85.2 FL (ref 80–94)
MONOCYTES # BLD AUTO: 0.65 10*3/MM3 (ref 0–0.9)
MONOCYTES NFR BLD AUTO: 8.3 % (ref 0–12)
NEUTROPHILS # BLD AUTO: 4.42 10*3/MM3 (ref 2–6.9)
NEUTROPHILS NFR BLD AUTO: 56.5 % (ref 37–80)
NRBC BLD AUTO-RTO: 0 /100 WBC (ref 0–0)
PLATELET # BLD AUTO: 123 10*3/MM3 (ref 130–400)
POTASSIUM SERPL-SCNC: 3.9 MMOL/L (ref 3.5–5.1)
PROT SERPL-MCNC: 7.5 G/DL (ref 6.3–8.2)
RBC # BLD AUTO: 5.27 10*6/MM3 (ref 4.7–6.1)
RHEUMATOID FACT SERPL-ACNC: <10 IU/ML (ref 0–13.9)
SODIUM SERPL-SCNC: 139 MMOL/L (ref 137–145)
URATE SERPL-MCNC: 8.5 MG/DL (ref 2.5–8.5)
WBC # BLD AUTO: 7.82 10*3/MM3 (ref 4.8–10.8)

## 2019-04-05 ENCOUNTER — TELEPHONE (OUTPATIENT)
Dept: PAIN MEDICINE | Facility: CLINIC | Age: 34
End: 2019-04-05

## 2019-04-05 NOTE — TELEPHONE ENCOUNTER
Patient called and left voicemail stating that he had Rx questions. Returned patient's call. He stated that [facility] did not receive his tens unit order and asked for it to be resent. He also asked about recovery time for his upcoming procedure. Advised patient he would be off work the day of and should be able to return to work next day. Patient verbalized understanding. Resent Rx for tens unit to [facility]

## 2019-04-15 ENCOUNTER — OUTSIDE FACILITY SERVICE (OUTPATIENT)
Dept: PAIN MEDICINE | Facility: CLINIC | Age: 34
End: 2019-04-15

## 2019-04-15 PROCEDURE — 64490 INJ PARAVERT F JNT C/T 1 LEV: CPT | Performed by: ANESTHESIOLOGY

## 2019-04-15 PROCEDURE — 99152 MOD SED SAME PHYS/QHP 5/>YRS: CPT | Performed by: ANESTHESIOLOGY

## 2019-04-15 PROCEDURE — 64491 INJ PARAVERT F JNT C/T 2 LEV: CPT | Performed by: ANESTHESIOLOGY

## 2019-04-16 ENCOUNTER — TELEPHONE (OUTPATIENT)
Dept: PAIN MEDICINE | Facility: CLINIC | Age: 34
End: 2019-04-16

## 2019-04-16 NOTE — TELEPHONE ENCOUNTER
Patient had dx/tx bilateral cervical facet joint injection on 04/15/19. Patient reports that he is doing good. He does have some soreness and used ice on it yesterday. Advised to start contrast therapy by alternating ice and heat. Patient verbalized understanding

## 2019-05-23 NOTE — PROGRESS NOTES
"Chief Complaint: \"Doing better.\"     History of Present Illness:   Patient: Mr. Khang Caruso, 34 y.o. male, with a 7-month history of neck pain and stiffness/pain in his left shoulder, which began without incident.  He returns today for post-procedure follow-up.  Patient was last seen on 04/15/2019 for bilateral C4-C5 and bilateral C5-C6 facet joint injections and experienced 85% relief that is ongoing.  Patient has participated in Physical Therapy.  Pain description: previously constant pain with intermittent exacerbation, described as stiffness, burning and sharp sensation.   Radiation of pain: The pain radiated into the posterior aspect of the shoulder, and into the bilateral occipital region  Pain intensity today: 1/10  Average pain intensity last week: 1/10  Pain intensity ranges from: 1/10 to 5/10  Aggravating factors: Pain increased with flexion and extension of the cervical spine  Alleviating factors: Pain decreased with none    Associated symptoms:   Patient denies pain, numbness, or weakness in the upper extremities  Patient denies any new bladder or bowel problems.   Patient denies difficulties with his balance or recent falls.      Review of previous therapies and additional medical records:  Khang Caruso has already failed the following measures, including:   Conservative measures: analgesics and physical therapy   Interventional measures: As referenced above.  Surgical measures: No history of cervical spine or shoulder surgery.   Khang Caruso underwent neurosurgical consultation with Dr. Michael Johnson on 03/12/2019, and was found not to be a surgical candidate.  Khang Caruso is a healthy adult other than his chronic pain.  In terms of current analgesics, Khang Caruso takes: meloxicam or Tylenol  I have reviewed [facility] Report #[id number], consistent to medication reconciliation.     Global Pain Scale 03-21 2019 05-29 2019               Pain  14  7               Feelings  4  1             "   Clinical outcomes  7  1               Activities  6  2               GPS Total:  31  11                  Review of Diagnostic Studies:    MRI CERVICAL SPINE- 01/23/2019:   C3-C4: broad-based disc bulge which produces mild bilateral neural foraminal narrowing. There is no significant central stenosis.  C4-C5: broad-based disc bulge, however there is no significant spinal or neural foraminal stenosis.   X-RAY SHOULDER- 01/22/2019: No sign of fracture or arthritis or significant soft tissue swelling.  MRI LEFT SHOULDER- 01/18/19: There is tendinosis of the supraspinatus and infraspinatus without full-thickness tear. Partial tear along the undersurface of the anterior supraspinatus. Partial tear is noted of the cranial-most fibers of the subscapularis. Teres minor is intact. The biceps tendon follows its normal intra-articular course and is located within the bicipital groove. No labral tear is identified. Mild subacromial bursitis. There is a hooked acromion, type III. Mild edematous change of the acromioclavicular joint.    Review of Systems   Musculoskeletal: Positive for neck pain.   All other systems reviewed and are negative.        Patient Active Problem List   Diagnosis   • Back pain   • Bulging of cervical intervertebral disc   • DDD (degenerative disc disease), cervical   • Foraminal stenosis of cervical region   • Incomplete tear of left rotator cuff   • Cervical spondylosis without myelopathy   • Myofascial pain   • Mild obesity   • Insomnia     History reviewed. No pertinent past medical history.      Past Surgical History:   Procedure Laterality Date   • NASAL TURBINATE REDUCTION           Family History   Problem Relation Age of Onset   • Arthritis Mother    • Heart disease Maternal Grandfather    • Diabetes Paternal Grandmother          Social History     Socioeconomic History   • Marital status:      Spouse name: Not on file   • Number of children: Not on file   • Years of education: Not on  "file   • Highest education level: Not on file   Tobacco Use   • Smoking status: Never Smoker   • Smokeless tobacco: Never Used   Substance and Sexual Activity   • Alcohol use: No   • Drug use: No   • Sexual activity: Defer           Current Outpatient Medications:   •  fluticasone (FLONASE) 50 MCG/ACT nasal spray, 2 sprays into the nostril(s) as directed by provider Daily., Disp: , Rfl:   •  Gel Base gel, CAPSAICIN 0.001% IMIPRAMINE 3% LIDOCAINE 10% MANNITOL 20% MELOXICAM 0.1% PRILOCAINE 2%. APPLY 1-2 G TO AFFECTED AREA 3-4 TIMES DAILY, Disp: 30 g, Rfl: PRN  •  meloxicam (MOBIC) 15 MG tablet, Take 1 tablet by mouth Daily., Disp: 30 tablet, Rfl: 1      No Known Allergies      /82   Pulse 83   Temp 97.8 °F (36.6 °C) (Temporal)   Resp 18   Ht 170.2 cm (67\")   Wt 89.8 kg (198 lb)   SpO2 98%   BMI 31.01 kg/m²       Physical Exam:  Constitutional: Patient is oriented to person, place, and time.  Appears well-developed and well-nourished.   Head: Normocephalic and atraumatic. Eyes: Conjunctivae and lids are normal. Pupils: Equal, round, and reactive to light.   Neck: Trachea normal. Neck supple. No JVD present.   Lymphatic: No cervical adenopathy  Pulmonary: No increased work of breathing or signs of respiratory distress.  Clear to auscultation bilaterally.   Cardiovascular: Normal rate and rhythm, normal S1 and S2, no murmurs.   Musculoskeletal   Gait and station: Normal  Cervical spine: Passive and active range of motion is full and without pain. Extension, flexion, lateral flexion, rotation of the cervical spine did not increase or reproduce pain. Cervical facet joint loading maneuvers are negative.   Shoulders: The range of motion of the glenohumeral joints is full and without pain.  Rotator cuff strength is 5/5.   Neurological: Patient is alert and oriented to person, place, and time. Speech: speech is normal. Cortical function: Normal mental status.   Cranial nerves: Cranial nerves 2-12 intact.   Reflex " Scores:  brachioradialis: 2+ bilaterally  biceps: 2+ bilaterally  triceps: 2+ bilaterally  patellar: 2+ bilaterally  Achilles: 2+ bilaterally  Motor strength: 5/5  Motor Tone: normal tone.   Involuntary movements: none.   Right ankle clonus: absent  Left ankle clonus: absent   Spurling sign is negative. Neck tornado test is negative. Lhermitte sign is negative. Negative long tract signs.   Sensation: No sensory loss. Sensory exam: intact to light touch, intact pain and temperature sensation, intact vibration sensation and normal proprioception.   Coordination: Normal finger to nose and heel to shin. Normal balance and negative Romberg's sign   Skin and subcutaneous tissue: Skin is warm and intact. No rash noted. No cyanosis.   Psychiatric: Judgment and insight: Normal. Orientation to person, place and time: Normal. Recent and remote memory: Intact. Mood and affect: Normal.     ASSESSMENT:   1. Cervical spondylosis without myelopathy    2. DDD (degenerative disc disease), cervical    3. Foraminal stenosis of cervical region    4. Incomplete tear of left rotator cuff    5. Myofascial pain        PLAN/MEDICAL DECISION MAKING: I have reviewed all available medical records as well as previous therapies as referenced above, and discussed the patient with Dr. Kincaid.  1. Interventional pain management measures: None indicated.  If patient fails to obtain sustained pain relief, then  we will proceed with diagnostic bilateral cervical medial branch blocks at C4, C5, C6; for bilateral cervical facet joints at C4-C5, C5-C6, to clarify the origin of chronic refractory mechanical/axial cervicalgia.  If patient experiences more than 80% pain relief along with significant improvement in the range of motion of the cervical spine, then he will be scheduled for a second set of diagnostic bilateral cervical medial branch blocks in order to proceed with bilateral cervical medial branch rhizotomies. Otherwise, we may consider cervical  epidural steroid injection by interlaminar approach.  We may repeat another epidural depending on patient's outcome.   2. Pharmacological measures: Reviewed and discussed.   A. Use prilocaine 2%, lidocaine 10%, imipramine 3%, capsaicin 0.001% and mannitol 20%  cream, apply 1 to 2 grams of cream to the affected areas every 4 to 6 hours as needed   3. Long-term rehabilitation efforts:  A. Start an exercise program such as yoga, swimming and daily walks for fitness  B. Use TENS unit/interferential unit  C. Contrast therapy: Apply ice-packs for 15-20 minutes, followed by heating pads for 15-20 minutes to affected area   4. The patient has been instructed to contact my office with any questions or difficulties. The patient understands the plan and agrees to proceed accordingly.       Patient Care Team:  Corry Green APRN as PCP - General (Nurse Practitioner)  Andrea Mccauley MD as Consulting Physician (Orthopedic Surgery)  Jose Reed MD as Consulting Physician (Otolaryngology)  Michael Johnson MD as Consulting Physician (Neurosurgery)  Jerry Kincaid MD as Consulting Physician (Pain Medicine)  Abel Sexton PA-C as Physician Assistant (Physician Assistant)     No orders of the defined types were placed in this encounter.        No future appointments.      Abel Sexton PA-C     EMR Dragon/Transcription disclaimer:  Much of this encounter note is an electronic transcription of spoken language to printed text. Electronic transcription of spoken language may permit erroneous, or at times, nonsensical words or phrases to be inadvertently transcribed. Although I have reviewed the note for such errors, some may still exist.

## 2019-05-29 ENCOUNTER — OFFICE VISIT (OUTPATIENT)
Dept: PAIN MEDICINE | Facility: CLINIC | Age: 34
End: 2019-05-29

## 2019-05-29 VITALS
OXYGEN SATURATION: 98 % | HEART RATE: 83 BPM | HEIGHT: 67 IN | RESPIRATION RATE: 18 BRPM | SYSTOLIC BLOOD PRESSURE: 122 MMHG | WEIGHT: 198 LBS | DIASTOLIC BLOOD PRESSURE: 82 MMHG | BODY MASS INDEX: 31.08 KG/M2 | TEMPERATURE: 97.8 F

## 2019-05-29 DIAGNOSIS — M47.812 CERVICAL SPONDYLOSIS WITHOUT MYELOPATHY: Primary | ICD-10-CM

## 2019-05-29 DIAGNOSIS — M48.02 FORAMINAL STENOSIS OF CERVICAL REGION: ICD-10-CM

## 2019-05-29 DIAGNOSIS — M75.112 INCOMPLETE TEAR OF LEFT ROTATOR CUFF: ICD-10-CM

## 2019-05-29 DIAGNOSIS — M79.18 MYOFASCIAL PAIN: ICD-10-CM

## 2019-05-29 DIAGNOSIS — M50.30 DDD (DEGENERATIVE DISC DISEASE), CERVICAL: ICD-10-CM

## 2019-05-29 PROCEDURE — 99213 OFFICE O/P EST LOW 20 MIN: CPT | Performed by: PHYSICIAN ASSISTANT

## 2019-07-08 RX ORDER — MELOXICAM 15 MG/1
15 TABLET ORAL DAILY
Qty: 30 TABLET | Refills: 1 | OUTPATIENT
Start: 2019-07-08

## 2019-08-12 ENCOUNTER — HOSPITAL ENCOUNTER (OUTPATIENT)
Dept: GENERAL RADIOLOGY | Facility: HOSPITAL | Age: 34
Discharge: HOME | End: 2019-08-12
Admitting: NURSE PRACTITIONER

## 2019-08-12 ENCOUNTER — OFFICE VISIT (OUTPATIENT)
Dept: INTERNAL MEDICINE | Facility: CLINIC | Age: 34
End: 2019-08-12

## 2019-08-12 VITALS
DIASTOLIC BLOOD PRESSURE: 82 MMHG | BODY MASS INDEX: 29.51 KG/M2 | HEIGHT: 67 IN | SYSTOLIC BLOOD PRESSURE: 124 MMHG | HEART RATE: 63 BPM | OXYGEN SATURATION: 100 % | WEIGHT: 188 LBS | TEMPERATURE: 98.5 F | RESPIRATION RATE: 14 BRPM

## 2019-08-12 DIAGNOSIS — M76.62 ACHILLES TENDINITIS OF LEFT LOWER EXTREMITY: Primary | ICD-10-CM

## 2019-08-12 PROCEDURE — 99213 OFFICE O/P EST LOW 20 MIN: CPT | Performed by: NURSE PRACTITIONER

## 2019-08-12 PROCEDURE — 73650 X-RAY EXAM OF HEEL: CPT

## 2019-08-12 RX ORDER — FLUTICASONE PROPIONATE 50 MCG
SPRAY, SUSPENSION (ML) NASAL
COMMUNITY

## 2019-08-12 RX ORDER — ACETAMINOPHEN 500 MG
500 TABLET ORAL EVERY 6 HOURS PRN
COMMUNITY
End: 2022-06-15

## 2019-08-12 NOTE — PROGRESS NOTES
Chief Complaint / Reason:      Chief Complaint   Patient presents with   • Tendonitis     achilles tendon pain since Sat, no injury that he is aware off.  Tried TENS unit, ice and tylenol.       Subjective     HPI  Patient presents today with complaints of Achilles tendon pain left side and states symptoms started on Saturday.  He denies any injury or trauma that he is aware of.  Denies fever or chills.  He states that he has tried rest ice and Tylenol along with a TENS unit.  He states he has only had minimal relief.  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  History taken from: patient    PMH/FH/Social History were reviewed and updated appropriately in the electronic medical record.   History reviewed. No pertinent past medical history.  Past Surgical History:   Procedure Laterality Date   • NASAL TURBINATE REDUCTION       Social History     Socioeconomic History   • Marital status:      Spouse name: Not on file   • Number of children: Not on file   • Years of education: Not on file   • Highest education level: Not on file   Tobacco Use   • Smoking status: Never Smoker   • Smokeless tobacco: Never Used   Substance and Sexual Activity   • Alcohol use: No   • Drug use: No   • Sexual activity: Defer     Family History   Problem Relation Age of Onset   • Arthritis Mother    • Heart disease Maternal Grandfather    • Diabetes Paternal Grandmother        Review of Systems:   Review of Systems   Constitutional: Positive for activity change and fatigue.   Respiratory: Negative.    Cardiovascular: Negative.    Musculoskeletal: Positive for arthralgias, gait problem and joint swelling.   Psychiatric/Behavioral: Negative.          All other  systems were reviewed and are negative.  Exceptions are noted in the subjective or above.      Objective     Vital Signs  Vitals:    08/12/19 0927   BP: 124/82   Pulse: 63   Resp: 14   Temp: 98.5 °F (36.9 °C)   SpO2: 100%       Body mass index is 29.44 kg/m².    Physical Exam   Constitutional: He is oriented to person, place, and time. He appears well-developed and well-nourished. No distress.   HENT:   Head: Normocephalic.   Neck: No JVD present.   Cardiovascular: Normal rate, regular rhythm, normal heart sounds and intact distal pulses.   No murmur heard.  No edema   Pulmonary/Chest: Effort normal and breath sounds normal. No respiratory distress. He exhibits no tenderness.   Abdominal: Soft. He exhibits no distension. There is no tenderness.   Musculoskeletal: He exhibits tenderness.        Left foot: There is decreased range of motion and bony tenderness.   Dorsiflexion worse than plantar flexion. Heel tenderness    Neurological: He is alert and oriented to person, place, and time.   Skin: Skin is warm and dry. Capillary refill takes less than 2 seconds. He is not diaphoretic.   Psychiatric: He has a normal mood and affect.   Nursing note and vitals reviewed.           Results Review:    I reviewed the patient's previous clinical results.       Medication Review:     Current Outpatient Medications:   •  acetaminophen (TYLENOL) 500 MG tablet, Take 500 mg by mouth Every 6 (Six) Hours As Needed for Mild Pain  (as needed for foot/ankle pain)., Disp: , Rfl:   •  diclofenac (VOLTAREN) 1 % gel gel, Apply 4 g topically to the appropriate area as directed 4 (Four) Times a Day As Needed (joint pain)., Disp: 100 g, Rfl: 0  •  fluticasone (FLONASE) 50 MCG/ACT nasal spray, fluticasone propionate 50 mcg/actuation nasal spray,suspension, Disp: , Rfl:     Assessment/Plan   Khang was seen today for tendonitis.    Diagnoses and all orders for this visit:    Achilles tendinitis of left lower extremity  -     XR calcaneUS 2+ vw  left  -     diclofenac (VOLTAREN) 1 % gel gel; Apply 4 g topically to the appropriate area as directed 4 (Four) Times a Day As Needed (joint pain).  Walking Boot- Left placed on in office  Recommend RICE and discussed worsening s/s   May need further imaging or referral to podiatry if symptoms worsen.  Discussed nonpharmacological interventions with patient he stated understanding.  Return if symptoms worsen or fail to improve.    Corry Green, APRN  08/12/2019

## 2019-08-27 ENCOUNTER — HOSPITAL ENCOUNTER (EMERGENCY)
Facility: HOSPITAL | Age: 34
Discharge: HOME | End: 2019-08-28
Attending: EMERGENCY MEDICINE | Admitting: EMERGENCY MEDICINE

## 2019-08-27 DIAGNOSIS — L03.113 CELLULITIS OF RIGHT UPPER EXTREMITY: Primary | ICD-10-CM

## 2019-08-27 PROCEDURE — 99283 EMERGENCY DEPT VISIT LOW MDM: CPT

## 2019-08-28 VITALS
WEIGHT: 188.8 LBS | DIASTOLIC BLOOD PRESSURE: 94 MMHG | OXYGEN SATURATION: 97 % | BODY MASS INDEX: 29.63 KG/M2 | HEART RATE: 65 BPM | SYSTOLIC BLOOD PRESSURE: 126 MMHG | TEMPERATURE: 97.3 F | RESPIRATION RATE: 18 BRPM | HEIGHT: 67 IN

## 2019-08-28 PROCEDURE — 25010000002 CEFTRIAXONE PER 250 MG: Performed by: EMERGENCY MEDICINE

## 2019-08-28 PROCEDURE — 96372 THER/PROPH/DIAG INJ SC/IM: CPT

## 2019-08-28 RX ORDER — CEPHALEXIN 500 MG/1
500 CAPSULE ORAL 4 TIMES DAILY
Qty: 28 CAPSULE | Refills: 0 | Status: SHIPPED | OUTPATIENT
Start: 2019-08-28 | End: 2020-08-12

## 2019-08-28 RX ORDER — CEFTRIAXONE 1 G/1
1000 INJECTION, POWDER, FOR SOLUTION INTRAMUSCULAR; INTRAVENOUS ONCE
Status: COMPLETED | OUTPATIENT
Start: 2019-08-28 | End: 2019-08-28

## 2019-08-28 RX ORDER — LIDOCAINE HYDROCHLORIDE 10 MG/ML
2.1 INJECTION, SOLUTION EPIDURAL; INFILTRATION; INTRACAUDAL; PERINEURAL ONCE
Status: COMPLETED | OUTPATIENT
Start: 2019-08-28 | End: 2019-08-28

## 2019-08-28 RX ADMIN — LIDOCAINE HYDROCHLORIDE 2.1 ML: 10 INJECTION, SOLUTION EPIDURAL; INFILTRATION; INTRACAUDAL; PERINEURAL at 00:36

## 2019-08-28 RX ADMIN — CEFTRIAXONE SODIUM 1000 MG: 1 INJECTION, POWDER, FOR SOLUTION INTRAMUSCULAR; INTRAVENOUS at 00:35

## 2019-08-28 RX ADMIN — MUPIROCIN: 20 OINTMENT TOPICAL at 00:35

## 2020-08-12 ENCOUNTER — OFFICE VISIT (OUTPATIENT)
Dept: INTERNAL MEDICINE | Facility: CLINIC | Age: 35
End: 2020-08-12

## 2020-08-12 VITALS
HEART RATE: 89 BPM | RESPIRATION RATE: 16 BRPM | WEIGHT: 212.4 LBS | OXYGEN SATURATION: 99 % | TEMPERATURE: 98 F | HEIGHT: 67 IN | DIASTOLIC BLOOD PRESSURE: 76 MMHG | SYSTOLIC BLOOD PRESSURE: 125 MMHG | BODY MASS INDEX: 33.34 KG/M2

## 2020-08-12 DIAGNOSIS — M79.671 FOOT PAIN, RIGHT: Primary | ICD-10-CM

## 2020-08-12 PROCEDURE — 99213 OFFICE O/P EST LOW 20 MIN: CPT | Performed by: NURSE PRACTITIONER

## 2020-08-12 RX ORDER — IBUPROFEN 800 MG/1
800 TABLET ORAL EVERY 8 HOURS PRN
Qty: 30 TABLET | Refills: 1 | OUTPATIENT
Start: 2020-08-12 | End: 2022-06-15

## 2020-08-12 NOTE — PROGRESS NOTES
Chief Complaint / Reason:      Chief Complaint   Patient presents with   • Pain     patient having right foot pain between the second and third toe that radiates to the top the foot and down the outer right side       Subjective     HPI  Patient presents today with complaints of right foot pain between the second and third toe he states that it radiates to the top of his foot and down the outer right side.  Patient states that he has had Mortons neuroma right foot in the past but did not have any intervention.  He states that over the past several months it has gotten worse.  Patient's BMI is 32.  He states that he has tried to wear comfortable good supportive shoes.  He does not indicate that anything worsens the symptoms but states that he has tried nonpharmacological interventions with minimal relief.  Patient has had imaging of left lower extremity in the past but has not had imaging of the right.  May need imaging but is agreeable to referral to podiatry.  He denies any injury or trauma that he is aware of.  History taken from: patient    PMH/FH/Social History were reviewed and updated appropriately in the electronic medical record.   History reviewed. No pertinent past medical history.  Past Surgical History:   Procedure Laterality Date   • NASAL TURBINATE REDUCTION       Social History     Socioeconomic History   • Marital status:      Spouse name: Not on file   • Number of children: Not on file   • Years of education: Not on file   • Highest education level: Not on file   Tobacco Use   • Smoking status: Never Smoker   • Smokeless tobacco: Never Used   Substance and Sexual Activity   • Alcohol use: No   • Drug use: No   • Sexual activity: Defer     Family History   Problem Relation Age of Onset   • Arthritis Mother    • Heart disease Maternal Grandfather    • Diabetes Paternal Grandmother        Review of Systems:   Review of Systems   Constitutional: Positive for fatigue.   Musculoskeletal: Positive  for arthralgias, gait problem and myalgias.   Allergic/Immunologic: Positive for environmental allergies.   Psychiatric/Behavioral: Positive for stress.         All other systems were reviewed and are negative.  Exceptions are noted in the subjective or above.      Objective     Vital Signs  Vitals:    08/12/20 1455   BP: 125/76   Pulse: 89   Resp: 16   Temp: 98 °F (36.7 °C)   SpO2: 99%       Body mass index is 33.27 kg/m².    Physical Exam   Constitutional: He is oriented to person, place, and time. He appears well-developed and well-nourished. No distress.   HENT:   Head: Normocephalic.   Cardiovascular: Normal rate, regular rhythm and normal heart sounds.   No murmur heard.  Pulmonary/Chest: Effort normal and breath sounds normal. No respiratory distress. He exhibits no tenderness.   Musculoskeletal: He exhibits tenderness and deformity.        Right foot: There is bony tenderness, crepitus and deformity. There is no tenderness and normal capillary refill.   Calluses noted and dry         Neurological: He is alert and oriented to person, place, and time.   Skin: Skin is warm and dry. Capillary refill takes less than 2 seconds. He is not diaphoretic.   Psychiatric: He has a normal mood and affect. His behavior is normal. Judgment and thought content normal.   Nursing note and vitals reviewed.           Results Review:    I reviewed the patient's previous clinical results.       Medication Review:     Current Outpatient Medications:   •  acetaminophen (TYLENOL) 500 MG tablet, Take 500 mg by mouth Every 6 (Six) Hours As Needed for Mild Pain  (as needed for foot/ankle pain)., Disp: , Rfl:   •  fluticasone (FLONASE) 50 MCG/ACT nasal spray, fluticasone propionate 50 mcg/actuation nasal spray,suspension, Disp: , Rfl:     Assessment/Plan   Khang was seen today for pain.    Diagnoses and all orders for this visit:    Foot pain, right  -     ibuprofen (ADVIL,MOTRIN) 800 MG tablet; Take 1 tablet by mouth Every 8 (Eight)  Hours As Needed for Moderate Pain .  -     Ambulatory Referral to Podiatry    Discussed nonpharmacological interventions with patient along with wearing comfortable good supportive shoes.  Discussed differential diagnosis.     Return if symptoms worsen or fail to improve, for Annual.    Corry Green, APRN  08/12/2020

## 2020-08-19 ENCOUNTER — OFFICE VISIT (OUTPATIENT)
Dept: INTERNAL MEDICINE | Facility: CLINIC | Age: 35
End: 2020-08-19

## 2020-08-19 VITALS
HEART RATE: 80 BPM | WEIGHT: 204 LBS | TEMPERATURE: 97.5 F | DIASTOLIC BLOOD PRESSURE: 77 MMHG | SYSTOLIC BLOOD PRESSURE: 127 MMHG | HEIGHT: 67 IN | RESPIRATION RATE: 15 BRPM | BODY MASS INDEX: 32.02 KG/M2 | OXYGEN SATURATION: 100 %

## 2020-08-19 DIAGNOSIS — Z00.00 PHYSICAL EXAM, ANNUAL: Primary | ICD-10-CM

## 2020-08-19 DIAGNOSIS — Z23 NEED FOR TDAP VACCINATION: ICD-10-CM

## 2020-08-19 DIAGNOSIS — R53.83 FATIGUE, UNSPECIFIED TYPE: ICD-10-CM

## 2020-08-19 DIAGNOSIS — Z13.228 SCREENING FOR ENDOCRINE, NUTRITIONAL, METABOLIC AND IMMUNITY DISORDER: ICD-10-CM

## 2020-08-19 DIAGNOSIS — Z13.21 SCREENING FOR ENDOCRINE, NUTRITIONAL, METABOLIC AND IMMUNITY DISORDER: ICD-10-CM

## 2020-08-19 DIAGNOSIS — Z13.0 SCREENING FOR ENDOCRINE, NUTRITIONAL, METABOLIC AND IMMUNITY DISORDER: ICD-10-CM

## 2020-08-19 DIAGNOSIS — Z13.29 SCREENING FOR ENDOCRINE, NUTRITIONAL, METABOLIC AND IMMUNITY DISORDER: ICD-10-CM

## 2020-08-19 DIAGNOSIS — E83.52 SERUM CALCIUM ELEVATED: ICD-10-CM

## 2020-08-19 PROCEDURE — 90471 IMMUNIZATION ADMIN: CPT | Performed by: NURSE PRACTITIONER

## 2020-08-19 PROCEDURE — 99395 PREV VISIT EST AGE 18-39: CPT | Performed by: NURSE PRACTITIONER

## 2020-08-19 PROCEDURE — 90715 TDAP VACCINE 7 YRS/> IM: CPT | Performed by: NURSE PRACTITIONER

## 2020-08-19 NOTE — PROGRESS NOTES
Subjective   Khang Caruso is a 35 y.o. male and is here for a comprehensive physical exam. The patient reports right foot pain that has been ongoing.  He states he is not sleeping as much as his almost 7-month-old has been waking up every hour wanting to eat.  Patient does grind teeth and has had previous nasal surgery and does wear a bite guard at night.  He denies SI or HI.  Patient denies chest pain, dyspnea, orthopnea, palpitations, lower extremity edema, headaches, weakness, visual disturbances or confusion.       Do you take any herbs or supplements that were not prescribed by a doctor?no  Are you taking calcium supplements? No  Are you taking aspirin daily? No  Exercise: was better before baby came but does try to stay active   Vision UTD:No  Dental UTD:Yes   History:  Patient receives prostate care here: N/A    He reports No decrease in urinary stream,  No nocturia, No dribbling, No hesitancy.    STDs:No  Sexually active at age:12   Abuse:No  Checks testicles:Yes   and 2 children  Does have tattoos   The following portions of the patient's history were reviewed and updated as appropriate: allergies, current medications, past family history, past medical history, past social history, past surgical history and problem list.    Review of Systems  Do you have pain that bothers you in your daily life? yes  Review of Systems   Constitutional: Positive for fatigue. Negative for chills, fever, unexpected weight gain and unexpected weight loss.   HENT: Negative for congestion, ear pain, hearing loss, rhinorrhea, sinus pressure, sneezing, sore throat and trouble swallowing.    Eyes: Negative for discharge and itching.   Respiratory: Negative for cough, chest tightness and shortness of breath.    Cardiovascular: Negative for chest pain, palpitations and leg swelling.   Gastrointestinal: Negative for abdominal pain, blood in stool, constipation, diarrhea and vomiting.   Endocrine: Negative for polydipsia and  "polyuria.   Genitourinary: Negative for difficulty urinating, dysuria, frequency, hematuria, urgency and urinary incontinence.   Musculoskeletal: Positive for arthralgias and gait problem. Negative for back pain and joint swelling.   Skin: Negative for rash and bruise.   Allergic/Immunologic: Positive for environmental allergies. Negative for immunocompromised state.   Neurological: Negative for dizziness, syncope, weakness, light-headedness, numbness and headache.   Hematological: Does not bruise/bleed easily.   Psychiatric/Behavioral: Positive for sleep disturbance. Negative for behavioral problems, dysphoric mood and stress. The patient is not nervous/anxious.          Objective   /77   Pulse 80   Temp 97.5 °F (36.4 °C)   Resp 15   Ht 170.2 cm (67\")   Wt 92.5 kg (204 lb)   SpO2 100%   BMI 31.95 kg/m²     Physical Exam   Constitutional: He is oriented to person, place, and time. He appears well-developed and well-nourished. No distress.   HENT:   Head: Normocephalic and atraumatic.   Right Ear: External ear and ear canal normal. Tympanic membrane is erythematous and bulging. No decreased hearing is noted.   Left Ear: External ear and ear canal normal. Tympanic membrane is erythematous and bulging. No decreased hearing is noted.   Nose: Mucosal edema and rhinorrhea (clear nasal secretions) present. No sinus tenderness. Right sinus exhibits no maxillary sinus tenderness and no frontal sinus tenderness. Left sinus exhibits no maxillary sinus tenderness and no frontal sinus tenderness.   Mouth/Throat: Mucous membranes are dry. Normal dentition. Posterior oropharyngeal erythema present.   PND crowded oropharynx   Eyes: Pupils are equal, round, and reactive to light. Conjunctivae, EOM and lids are normal.   Neck: Normal range of motion. Neck supple. No JVD present. Carotid bruit is not present. No thyroid mass and no thyromegaly present.   Cardiovascular: Normal rate, regular rhythm, S1 normal, S2 normal, " normal heart sounds and intact distal pulses.   No murmur heard.  Pulmonary/Chest: Effort normal and breath sounds normal. No respiratory distress.   Abdominal: Soft. Bowel sounds are normal. He exhibits no abdominal bruit and no mass. There is no hepatosplenomegaly. There is no tenderness.   Genitourinary:   Genitourinary Comments: Deferred    Musculoskeletal: Normal range of motion. He exhibits no edema or tenderness.        Right foot: There is bony tenderness (Pain in right foot).   Lymphadenopathy:        Head (right side): No submental, no submandibular and no tonsillar adenopathy present.        Head (left side): No submental, no submandibular and no tonsillar adenopathy present.     He has no cervical adenopathy.   Neurological: He is alert and oriented to person, place, and time. He has normal strength. He displays normal reflexes. No cranial nerve deficit or sensory deficit. Gait normal.   Skin: Skin is warm and dry. Capillary refill takes less than 2 seconds. Turgor is normal. No rash noted. No cyanosis. Nails show no clubbing.   Tattoos and generalized skin lesions   Psychiatric: He has a normal mood and affect. His speech is normal and behavior is normal. Judgment and thought content normal.   Nursing note and vitals reviewed.        Assessment/Plan   Healthy male exam.    1.Khang was seen today for annual exam.    Diagnoses and all orders for this visit:    Physical exam, annual  -     Comprehensive metabolic panel  -     Lipid panel  -     CBC w AUTO Differential    Need for Tdap vaccination  -     Tdap Vaccine Greater Than or Equal To 8yo IM    Serum calcium elevated  -     Comprehensive metabolic panel  -     PTH, Intact    Screening for endocrine, nutritional, metabolic and immunity disorder  -     TSH  -     T4, free  -     Hemoglobin A1c  -     Vitamin B12  -     Vitamin D 25 hydroxy    BMI 32.0-32.9,adult  -     TSH  -     T4, free  -     Hemoglobin A1c  -     Testosterone (Free & Total), LC /  MS  Patient's Body mass index is 31.95 kg/m². BMI is above normal parameters. Recommendations include: exercise counseling and nutrition counseling.    Fatigue, unspecified type  -     Testosterone (Free & Total), LC / MS    Discussed sleep hygiene     2. Patient Counseling:  --Nutrition: Stressed importance of moderation in sodium/caffeine intake, saturated fat and cholesterol, caloric balance, sufficient intake of fresh fruits, vegetables, fiber, calcium, iron,   --Discussed the issue of calcium supplement, and the daily use of baby aspirin if applicable.  --Exercise: Stressed the importance of regular exercise.   --Substance Abuse: Discussed cessation/primary prevention of tobacco (if applicable, alcohol, or other drug use (if applicable); driving or other dangerous activities under the influence; availability of treatment for abuse.    --Sexuality: Discussed sexually transmitted diseases, partner selection, use of condoms, avoidance of unintended pregnancy  and contraceptive alternatives.   --Injury prevention: Discussed safety belts, safety helmets, smoke detector, smoking near bedding or upholstery.   --Dental health: Discussed importance of regular tooth brushing, flossing, and dental visits.  --Immunizations reviewed.  --Discussed benefits of regular vision screening   --After hours service discussed with patient    3. Discussed the patient's BMI with him.  The BMI is above average; BMI management plan is completed  4. Follow up in one year and prn      Corry Green, APRN 08/19/2020

## 2020-08-22 LAB
25(OH)D3+25(OH)D2 SERPL-MCNC: 37.3 NG/ML (ref 30–100)
ALBUMIN SERPL-MCNC: 5.1 G/DL (ref 3.5–5.2)
ALBUMIN/GLOB SERPL: 2.7 G/DL
ALP SERPL-CCNC: 44 U/L (ref 39–117)
ALT SERPL-CCNC: 41 U/L (ref 1–41)
AST SERPL-CCNC: 18 U/L (ref 1–40)
BASOPHILS # BLD MANUAL: 0.06 10*3/MM3 (ref 0–0.2)
BASOPHILS NFR BLD MANUAL: 1 % (ref 0–1.5)
BILIRUB SERPL-MCNC: 0.9 MG/DL (ref 0–1.2)
BUN SERPL-MCNC: 22 MG/DL (ref 6–20)
BUN/CREAT SERPL: 19.1 (ref 7–25)
CALCIUM SERPL-MCNC: 9.9 MG/DL (ref 8.6–10.5)
CHLORIDE SERPL-SCNC: 102 MMOL/L (ref 98–107)
CHOLEST SERPL-MCNC: 216 MG/DL (ref 0–200)
CO2 SERPL-SCNC: 25.5 MMOL/L (ref 22–29)
CREAT SERPL-MCNC: 1.15 MG/DL (ref 0.76–1.27)
DIFFERENTIAL COMMENT: NORMAL
ERYTHROCYTE [DISTWIDTH] IN BLOOD BY AUTOMATED COUNT: 12.7 % (ref 12.3–15.4)
GLOBULIN SER CALC-MCNC: 1.9 GM/DL
GLUCOSE SERPL-MCNC: 90 MG/DL (ref 65–99)
HBA1C MFR BLD: 4.75 % (ref 4.8–5.6)
HCT VFR BLD AUTO: 47.4 % (ref 37.5–51)
HDLC SERPL-MCNC: 47 MG/DL (ref 40–60)
HGB BLD-MCNC: 16.6 G/DL (ref 13–17.7)
LDLC SERPL CALC-MCNC: 148 MG/DL (ref 0–100)
LYMPHOCYTES # BLD MANUAL: 1.68 10*3/MM3 (ref 0.7–3.1)
LYMPHOCYTES NFR BLD MANUAL: 30 % (ref 19.6–45.3)
MCH RBC QN AUTO: 30.6 PG (ref 26.6–33)
MCHC RBC AUTO-ENTMCNC: 35 G/DL (ref 31.5–35.7)
MCV RBC AUTO: 87.3 FL (ref 79–97)
MONOCYTES # BLD MANUAL: 0.45 10*3/MM3 (ref 0.1–0.9)
MONOCYTES NFR BLD MANUAL: 8 % (ref 5–12)
NEUTROPHILS # BLD MANUAL: 3.42 10*3/MM3 (ref 1.7–7)
NEUTROPHILS NFR BLD MANUAL: 61 % (ref 42.7–76)
PLATELET # BLD AUTO: 109 10*3/MM3 (ref 140–450)
PLATELET BLD QL SMEAR: NORMAL
POTASSIUM SERPL-SCNC: 5 MMOL/L (ref 3.5–5.2)
PROT SERPL-MCNC: 7 G/DL (ref 6–8.5)
PTH-INTACT SERPL-MCNC: 24 PG/ML (ref 15–65)
RBC # BLD AUTO: 5.43 10*6/MM3 (ref 4.14–5.8)
RBC MORPH BLD: NORMAL
SODIUM SERPL-SCNC: 138 MMOL/L (ref 136–145)
T4 FREE SERPL-MCNC: 1.44 NG/DL (ref 0.93–1.7)
TESTOST FREE SERPL-MCNC: 10 PG/ML (ref 8.7–25.1)
TESTOST SERPL-MCNC: 550.1 NG/DL (ref 264–916)
TRIGL SERPL-MCNC: 104 MG/DL (ref 0–150)
TSH SERPL DL<=0.005 MIU/L-ACNC: 2.05 UIU/ML (ref 0.27–4.2)
VIT B12 SERPL-MCNC: 468 PG/ML (ref 211–946)
VLDLC SERPL CALC-MCNC: 20.8 MG/DL
WBC # BLD AUTO: 5.61 10*3/MM3 (ref 3.4–10.8)

## 2020-08-26 NOTE — PROGRESS NOTES
+ Please contact patient and let him know that lab results are released for his review and it appears his BUN is elevated recommend maintaining hydration also his LDL which is the bad cholesterol is 148 recommend it being less than 100 and improving HDL which is the good cholesterol.  Platelets were slightly low and have been there 109 and they were previously 123 his manual differential showed normal white blood cell count, red blood cell count and platelet morphology

## 2021-11-01 ENCOUNTER — LAB (OUTPATIENT)
Dept: LAB | Facility: HOSPITAL | Age: 36
End: 2021-11-01

## 2021-11-01 ENCOUNTER — TRANSCRIBE ORDERS (OUTPATIENT)
Dept: LAB | Facility: HOSPITAL | Age: 36
End: 2021-11-01

## 2021-11-01 DIAGNOSIS — Z01.818 PRE-OP TESTING: Primary | ICD-10-CM

## 2021-11-01 DIAGNOSIS — Z01.818 PRE-OP TESTING: ICD-10-CM

## 2021-11-01 LAB — SARS-COV-2 RNA NOSE QL NAA+PROBE: NOT DETECTED

## 2021-11-01 PROCEDURE — U0004 COV-19 TEST NON-CDC HGH THRU: HCPCS

## 2023-09-20 ENCOUNTER — OFFICE VISIT (OUTPATIENT)
Dept: INTERNAL MEDICINE | Facility: CLINIC | Age: 38
End: 2023-09-20
Payer: COMMERCIAL

## 2023-09-20 VITALS
BODY MASS INDEX: 33.12 KG/M2 | RESPIRATION RATE: 16 BRPM | HEIGHT: 67 IN | DIASTOLIC BLOOD PRESSURE: 78 MMHG | OXYGEN SATURATION: 98 % | SYSTOLIC BLOOD PRESSURE: 135 MMHG | TEMPERATURE: 97.7 F | HEART RATE: 57 BPM | WEIGHT: 211 LBS

## 2023-09-20 DIAGNOSIS — Z13.0 SCREENING FOR ENDOCRINE, NUTRITIONAL, METABOLIC AND IMMUNITY DISORDER: ICD-10-CM

## 2023-09-20 DIAGNOSIS — Z13.21 SCREENING FOR ENDOCRINE, NUTRITIONAL, METABOLIC AND IMMUNITY DISORDER: ICD-10-CM

## 2023-09-20 DIAGNOSIS — M25.50 PAIN IN JOINT INVOLVING MULTIPLE SITES: ICD-10-CM

## 2023-09-20 DIAGNOSIS — Z13.228 SCREENING FOR ENDOCRINE, NUTRITIONAL, METABOLIC AND IMMUNITY DISORDER: ICD-10-CM

## 2023-09-20 DIAGNOSIS — Z13.29 SCREENING FOR ENDOCRINE, NUTRITIONAL, METABOLIC AND IMMUNITY DISORDER: ICD-10-CM

## 2023-09-20 DIAGNOSIS — Z00.00 PHYSICAL EXAM, ANNUAL: Primary | ICD-10-CM

## 2023-09-20 NOTE — PROGRESS NOTES
"Subjective   Khang Caruso is a 38 y.o. male and is here for a comprehensive physical exam. The patient reports problems - ADHD and joint pain and has been treated for gout in the past .  The past year he has had 3 rounds of steroids.   Has been on Ritalin in the past along with another medication he is unsure what it is but he has been having trouble focusing.    Do you take any herbs or supplements that were not prescribed by a doctor?no  Are you taking calcium supplements? No  Are you taking aspirin daily? No  Exercise:  Vision UTD:up to date  Dental UTD:not up to date - advised patient to schedule  Dermatology UTD:not up to date - advised patient to schedule or closely monitor for changes in skin lesions   History:  Patient receives prostate care here: N/A  He reports No decrease in urinary stream,  No nocturia, No dribbling, No hesitancy.    STDs:No  Sexually active at age:12          Abuse:No  Checks testicles:Yes   and 2 children  Does have tattoos     The following portions of the patient's history were reviewed and updated as appropriate: allergies, current medications, past family history, past medical history, past social history, past surgical history, and problem list.    Review of Systems  Do you have pain that bothers you in your daily life? no  Review of Systems      Objective   /78   Pulse 57   Temp 97.7 °F (36.5 °C) (Temporal)   Resp 16   Ht 170.2 cm (67\")   Wt 95.7 kg (211 lb)   SpO2 98%   BMI 33.05 kg/m²   BMI is >= 30 and <35. (Class 1 Obesity). The following options were offered after discussion;: exercise counseling/recommendations and nutrition counseling/recommendations      Physical Exam  Vitals and nursing note reviewed.   Constitutional:       General: He is not in acute distress.     Appearance: He is well-developed. He is obese.   HENT:      Head: Normocephalic and atraumatic.      Right Ear: Ear canal and external ear normal. No decreased hearing noted. Tympanic " membrane is erythematous and bulging.      Left Ear: Ear canal and external ear normal. No decreased hearing noted. Tympanic membrane is erythematous and bulging.      Nose: Mucosal edema present. No rhinorrhea.      Right Sinus: No maxillary sinus tenderness or frontal sinus tenderness.      Left Sinus: No maxillary sinus tenderness or frontal sinus tenderness.      Mouth/Throat:      Mouth: Mucous membranes are dry.      Dentition: Normal dentition.      Pharynx: Posterior oropharyngeal erythema present.      Comments: PND    Eyes:      General: Lids are normal.      Conjunctiva/sclera: Conjunctivae normal.      Pupils: Pupils are equal, round, and reactive to light.   Neck:      Thyroid: No thyroid mass or thyromegaly.      Vascular: No carotid bruit or JVD.   Cardiovascular:      Rate and Rhythm: Normal rate and regular rhythm.      Pulses: Normal pulses.      Heart sounds: Normal heart sounds, S1 normal and S2 normal. No murmur heard.  Pulmonary:      Effort: Pulmonary effort is normal. No respiratory distress.      Breath sounds: Normal breath sounds.   Abdominal:      General: Bowel sounds are normal. There is no distension or abdominal bruit.      Palpations: Abdomen is soft. There is no mass.      Tenderness: There is no abdominal tenderness.   Genitourinary:     Comments: Deferred   Musculoskeletal:         General: Normal range of motion.      Cervical back: Normal range of motion and neck supple.   Lymphadenopathy:      Head:      Right side of head: No submental, submandibular or tonsillar adenopathy.      Left side of head: No submental, submandibular or tonsillar adenopathy.      Cervical: No cervical adenopathy.      Upper Body:      Right upper body: No supraclavicular adenopathy.      Left upper body: No supraclavicular adenopathy.   Skin:     General: Skin is warm and dry.      Capillary Refill: Capillary refill takes less than 2 seconds.      Findings: No rash.      Nails: There is no clubbing.    Neurological:      Mental Status: He is alert and oriented to person, place, and time.      Cranial Nerves: No cranial nerve deficit.      Sensory: No sensory deficit.      Gait: Gait normal.      Deep Tendon Reflexes: Reflexes normal.   Psychiatric:         Speech: Speech normal.         Behavior: Behavior normal.         Thought Content: Thought content normal.         Judgment: Judgment normal.       PHQ-9 Depression Screening  Little interest or pleasure in doing things? 0-->not at all   Feeling down, depressed, or hopeless? 0-->not at all   Trouble falling or staying asleep, or sleeping too much? 1-->several days   Feeling tired or having little energy? 1-->several days   Poor appetite or overeating? 0-->not at all   Feeling bad about yourself - or that you are a failure or have let yourself or your family down? 0-->not at all   Trouble concentrating on things, such as reading the newspaper or watching television? 1-->several days   Moving or speaking so slowly that other people could have noticed? Or the opposite - being so fidgety or restless that you have been moving around a lot more than usual? 0-->not at all   Thoughts that you would be better off dead, or of hurting yourself in some way? 0-->not at all   PHQ-9 Total Score 3   If you checked off any problems, how difficult have these problems made it for you to do your work, take care of things at home, or get along with other people? not difficult at all      9/20/2023   Anxiety LARRY-7    Feeling nervous, anxious or on edge 1    Not being able to stop or control worrying 1    Worrying too much about different things 1    Trouble Relaxing 0    Being so restless that it is hard to sit still 0    Becoming easily annoyed or irritable 1    Feeling afraid as if something awful might happen 0    LARRY 7 Total Score 4    If you checked any problems, how difficult have these problems made it for you to do your work, take care of things at home, or get along with  other people Somewhat difficult          Assessment & Plan   Healthy male exam.     1.Diagnoses and all orders for this visit:    1. Physical exam, annual (Primary)  -     CBC Auto Differential  -     Comprehensive Metabolic Panel  -     Lipid Panel    2. Screening for endocrine, nutritional, metabolic and immunity disorder  -     Insulin, Free & Total, Serum  -     Hemoglobin A1c  -     Vitamin B12  -     TSH  -     T4  -     Testosterone (Free & Total), LC / MS    3. Pain in joint involving multiple sites  -     LINNETTE by IFA, Reflex 9-biomarkers profile  -     C-reactive protein  -     Sedimentation Rate  -     Uric acid        2. Patient Counseling:  --Nutrition: Stressed importance of moderation in sodium/caffeine intake, saturated fat and cholesterol, caloric balance, sufficient intake of fresh fruits, vegetables, fiber, calcium, iron,   --Discussed the issue of calcium supplement, and the daily use of baby aspirin if applicable.  --Exercise: Stressed the importance of regular exercise.   --Substance Abuse: Discussed cessation/primary prevention of tobacco (if applicable, alcohol, or other drug use (if applicable); driving or other dangerous activities under the influence; availability of treatment for abuse.    --Sexuality: Discussed sexually transmitted diseases, partner selection, use of condoms, avoidance of unintended pregnancy  and contraceptive alternatives.   --Injury prevention: Discussed safety belts, safety helmets, smoke detector, smoking near bedding or upholstery.   --Dental health: Discussed importance of regular tooth brushing, flossing, and dental visits.  --Immunizations reviewed.  --Discussed benefits of screening colonoscopy (if applicable).  --After hours service discussed with patient    3. Discussed the patient's BMI with him.  The BMI is above average; BMI management plan is completed  4. Follow up as needed for acute illness and prn      Corry Green, APRN 09/20/2023

## 2023-09-29 RX ORDER — LISINOPRIL 5 MG/1
5 TABLET ORAL DAILY
Qty: 30 TABLET | Refills: 1 | Status: SHIPPED | OUTPATIENT
Start: 2023-09-29

## 2023-10-05 LAB
ALBUMIN SERPL-MCNC: 4.7 G/DL (ref 3.5–5.2)
ALBUMIN/GLOB SERPL: 2.2 G/DL
ALP SERPL-CCNC: 40 U/L (ref 39–117)
ALT SERPL-CCNC: 35 U/L (ref 1–41)
ANA SER QL IF: NEGATIVE
AST SERPL-CCNC: 19 U/L (ref 1–40)
BASOPHILS # BLD AUTO: 0.04 10*3/MM3 (ref 0–0.2)
BASOPHILS NFR BLD AUTO: 0.7 % (ref 0–1.5)
BILIRUB SERPL-MCNC: 0.8 MG/DL (ref 0–1.2)
BUN SERPL-MCNC: 15 MG/DL (ref 6–20)
BUN/CREAT SERPL: 13.2 (ref 7–25)
CALCIUM SERPL-MCNC: 10.1 MG/DL (ref 8.6–10.5)
CHLORIDE SERPL-SCNC: 106 MMOL/L (ref 98–107)
CHOLEST SERPL-MCNC: 205 MG/DL (ref 0–200)
CO2 SERPL-SCNC: 25.4 MMOL/L (ref 22–29)
CREAT SERPL-MCNC: 1.14 MG/DL (ref 0.76–1.27)
CRP SERPL-MCNC: <0.3 MG/DL (ref 0–0.5)
EGFRCR SERPLBLD CKD-EPI 2021: 83.9 ML/MIN/1.73
EOSINOPHIL # BLD AUTO: 0.05 10*3/MM3 (ref 0–0.4)
EOSINOPHIL NFR BLD AUTO: 0.9 % (ref 0.3–6.2)
ERYTHROCYTE [DISTWIDTH] IN BLOOD BY AUTOMATED COUNT: 12.7 % (ref 12.3–15.4)
ERYTHROCYTE [SEDIMENTATION RATE] IN BLOOD BY WESTERGREN METHOD: 1 MM/HR (ref 0–15)
GLOBULIN SER CALC-MCNC: 2.1 GM/DL
GLUCOSE SERPL-MCNC: 105 MG/DL (ref 65–99)
HBA1C MFR BLD: 5.2 % (ref 4.8–5.6)
HCT VFR BLD AUTO: 46.7 % (ref 37.5–51)
HDLC SERPL-MCNC: 43 MG/DL (ref 40–60)
HGB BLD-MCNC: 15.7 G/DL (ref 13–17.7)
IMM GRANULOCYTES # BLD AUTO: 0.03 10*3/MM3 (ref 0–0.05)
IMM GRANULOCYTES NFR BLD AUTO: 0.5 % (ref 0–0.5)
INSULIN FREE SERPL-ACNC: NORMAL U[IU]/ML
INSULIN SERPL-ACNC: NORMAL U[IU]/ML
LABORATORY COMMENT REPORT: NORMAL
LDLC SERPL CALC-MCNC: 143 MG/DL (ref 0–100)
LYMPHOCYTES # BLD AUTO: 1.79 10*3/MM3 (ref 0.7–3.1)
LYMPHOCYTES NFR BLD AUTO: 31.2 % (ref 19.6–45.3)
MCH RBC QN AUTO: 29.7 PG (ref 26.6–33)
MCHC RBC AUTO-ENTMCNC: 33.6 G/DL (ref 31.5–35.7)
MCV RBC AUTO: 88.4 FL (ref 79–97)
MONOCYTES # BLD AUTO: 0.42 10*3/MM3 (ref 0.1–0.9)
MONOCYTES NFR BLD AUTO: 7.3 % (ref 5–12)
NEUTROPHILS # BLD AUTO: 3.41 10*3/MM3 (ref 1.7–7)
NEUTROPHILS NFR BLD AUTO: 59.4 % (ref 42.7–76)
NRBC BLD AUTO-RTO: 0 /100 WBC (ref 0–0.2)
PLATELET # BLD AUTO: 105 10*3/MM3 (ref 140–450)
POTASSIUM SERPL-SCNC: 4.6 MMOL/L (ref 3.5–5.2)
PROT SERPL-MCNC: 6.8 G/DL (ref 6–8.5)
RBC # BLD AUTO: 5.28 10*6/MM3 (ref 4.14–5.8)
SODIUM SERPL-SCNC: 141 MMOL/L (ref 136–145)
T4 SERPL-MCNC: 6.5 UG/DL (ref 4.5–12)
TESTOST FREE SERPL-MCNC: 7.2 PG/ML (ref 8.7–25.1)
TESTOST SERPL-MCNC: 644.3 NG/DL (ref 264–916)
TRIGL SERPL-MCNC: 106 MG/DL (ref 0–150)
TSH SERPL DL<=0.005 MIU/L-ACNC: 1.9 UIU/ML (ref 0.27–4.2)
URATE SERPL-MCNC: 8.7 MG/DL (ref 3.4–7)
VIT B12 SERPL-MCNC: 412 PG/ML (ref 211–946)
VLDLC SERPL CALC-MCNC: 19 MG/DL (ref 5–40)
WBC # BLD AUTO: 5.74 10*3/MM3 (ref 3.4–10.8)

## 2023-10-10 LAB
ALBUMIN SERPL-MCNC: 4.7 G/DL (ref 3.5–5.2)
ALBUMIN/GLOB SERPL: 2.2 G/DL
ALP SERPL-CCNC: 40 U/L (ref 39–117)
ALT SERPL-CCNC: 35 U/L (ref 1–41)
ANA SER QL IF: NEGATIVE
AST SERPL-CCNC: 19 U/L (ref 1–40)
BASOPHILS # BLD AUTO: 0.04 10*3/MM3 (ref 0–0.2)
BASOPHILS NFR BLD AUTO: 0.7 % (ref 0–1.5)
BILIRUB SERPL-MCNC: 0.8 MG/DL (ref 0–1.2)
BUN SERPL-MCNC: 15 MG/DL (ref 6–20)
BUN/CREAT SERPL: 13.2 (ref 7–25)
CALCIUM SERPL-MCNC: 10.1 MG/DL (ref 8.6–10.5)
CHLORIDE SERPL-SCNC: 106 MMOL/L (ref 98–107)
CHOLEST SERPL-MCNC: 205 MG/DL (ref 0–200)
CO2 SERPL-SCNC: 25.4 MMOL/L (ref 22–29)
CREAT SERPL-MCNC: 1.14 MG/DL (ref 0.76–1.27)
CRP SERPL-MCNC: <0.3 MG/DL (ref 0–0.5)
EGFRCR SERPLBLD CKD-EPI 2021: 83.9 ML/MIN/1.73
EOSINOPHIL # BLD AUTO: 0.05 10*3/MM3 (ref 0–0.4)
EOSINOPHIL NFR BLD AUTO: 0.9 % (ref 0.3–6.2)
ERYTHROCYTE [DISTWIDTH] IN BLOOD BY AUTOMATED COUNT: 12.7 % (ref 12.3–15.4)
ERYTHROCYTE [SEDIMENTATION RATE] IN BLOOD BY WESTERGREN METHOD: 1 MM/HR (ref 0–15)
GLOBULIN SER CALC-MCNC: 2.1 GM/DL
GLUCOSE SERPL-MCNC: 105 MG/DL (ref 65–99)
HBA1C MFR BLD: 5.2 % (ref 4.8–5.6)
HCT VFR BLD AUTO: 46.7 % (ref 37.5–51)
HDLC SERPL-MCNC: 43 MG/DL (ref 40–60)
HGB BLD-MCNC: 15.7 G/DL (ref 13–17.7)
IMM GRANULOCYTES # BLD AUTO: 0.03 10*3/MM3 (ref 0–0.05)
IMM GRANULOCYTES NFR BLD AUTO: 0.5 % (ref 0–0.5)
INSULIN FREE SERPL-ACNC: 8.1 UU/ML
INSULIN SERPL-ACNC: 8.1 UU/ML
LABORATORY COMMENT REPORT: NORMAL
LDLC SERPL CALC-MCNC: 143 MG/DL (ref 0–100)
LYMPHOCYTES # BLD AUTO: 1.79 10*3/MM3 (ref 0.7–3.1)
LYMPHOCYTES NFR BLD AUTO: 31.2 % (ref 19.6–45.3)
MCH RBC QN AUTO: 29.7 PG (ref 26.6–33)
MCHC RBC AUTO-ENTMCNC: 33.6 G/DL (ref 31.5–35.7)
MCV RBC AUTO: 88.4 FL (ref 79–97)
MONOCYTES # BLD AUTO: 0.42 10*3/MM3 (ref 0.1–0.9)
MONOCYTES NFR BLD AUTO: 7.3 % (ref 5–12)
NEUTROPHILS # BLD AUTO: 3.41 10*3/MM3 (ref 1.7–7)
NEUTROPHILS NFR BLD AUTO: 59.4 % (ref 42.7–76)
NRBC BLD AUTO-RTO: 0 /100 WBC (ref 0–0.2)
PLATELET # BLD AUTO: 105 10*3/MM3 (ref 140–450)
POTASSIUM SERPL-SCNC: 4.6 MMOL/L (ref 3.5–5.2)
PROT SERPL-MCNC: 6.8 G/DL (ref 6–8.5)
RBC # BLD AUTO: 5.28 10*6/MM3 (ref 4.14–5.8)
SODIUM SERPL-SCNC: 141 MMOL/L (ref 136–145)
T4 SERPL-MCNC: 6.5 UG/DL (ref 4.5–12)
TESTOST FREE SERPL-MCNC: 7.2 PG/ML (ref 8.7–25.1)
TESTOST SERPL-MCNC: 644.3 NG/DL (ref 264–916)
TRIGL SERPL-MCNC: 106 MG/DL (ref 0–150)
TSH SERPL DL<=0.005 MIU/L-ACNC: 1.9 UIU/ML (ref 0.27–4.2)
URATE SERPL-MCNC: 8.7 MG/DL (ref 3.4–7)
VIT B12 SERPL-MCNC: 412 PG/ML (ref 211–946)
VLDLC SERPL CALC-MCNC: 19 MG/DL (ref 5–40)
WBC # BLD AUTO: 5.74 10*3/MM3 (ref 3.4–10.8)

## 2023-10-16 DIAGNOSIS — M54.50 BILATERAL LOW BACK PAIN WITHOUT SCIATICA, UNSPECIFIED CHRONICITY: ICD-10-CM

## 2023-10-16 DIAGNOSIS — E79.0 ELEVATED URIC ACID IN BLOOD: ICD-10-CM

## 2023-10-16 DIAGNOSIS — R03.0 ELEVATED BLOOD PRESSURE READING: Primary | ICD-10-CM

## 2023-10-16 NOTE — PROGRESS NOTES
Discussed results with patient and recommendations which include diet and exercise.  Recommend further evaluation of low platelets and we will order renal ultrasound for further evaluation of blood pressure being elevated and uric acid level staying high.  Discussed diabetes prevention along with improvement of lipid panel.

## 2023-10-19 RX ORDER — DOXYCYCLINE 100 MG/1
100 TABLET ORAL 2 TIMES DAILY
Qty: 20 TABLET | Refills: 0 | Status: SHIPPED | OUTPATIENT
Start: 2023-10-19 | End: 2023-10-29

## 2023-10-19 RX ORDER — DEXTROMETHORPHAN HYDROBROMIDE AND PROMETHAZINE HYDROCHLORIDE 15; 6.25 MG/5ML; MG/5ML
5 SYRUP ORAL NIGHTLY PRN
Qty: 118 ML | Refills: 0 | Status: SHIPPED | OUTPATIENT
Start: 2023-10-19

## 2023-11-29 RX ORDER — COLCHICINE 0.6 MG/1
TABLET ORAL
Qty: 3 TABLET | Refills: 2 | Status: SHIPPED | OUTPATIENT
Start: 2023-11-29

## 2024-01-30 ENCOUNTER — HOSPITAL ENCOUNTER (EMERGENCY)
Facility: HOSPITAL | Age: 39
Discharge: HOME | End: 2024-01-30
Attending: STUDENT IN AN ORGANIZED HEALTH CARE EDUCATION/TRAINING PROGRAM | Admitting: STUDENT IN AN ORGANIZED HEALTH CARE EDUCATION/TRAINING PROGRAM
Payer: COMMERCIAL

## 2024-01-30 ENCOUNTER — APPOINTMENT (OUTPATIENT)
Dept: CT IMAGING | Facility: HOSPITAL | Age: 39
End: 2024-01-30
Payer: COMMERCIAL

## 2024-01-30 VITALS
WEIGHT: 211 LBS | BODY MASS INDEX: 33.12 KG/M2 | HEIGHT: 67 IN | TEMPERATURE: 98.6 F | DIASTOLIC BLOOD PRESSURE: 63 MMHG | OXYGEN SATURATION: 96 % | HEART RATE: 122 BPM | SYSTOLIC BLOOD PRESSURE: 105 MMHG | RESPIRATION RATE: 16 BRPM

## 2024-01-30 DIAGNOSIS — N39.0 RECURRENT UTI: ICD-10-CM

## 2024-01-30 DIAGNOSIS — N30.01 ACUTE CYSTITIS WITH HEMATURIA: Primary | ICD-10-CM

## 2024-01-30 LAB
ALBUMIN SERPL-MCNC: 4.6 G/DL (ref 3.5–5.2)
ALBUMIN/GLOB SERPL: 1.6 G/DL
ALP SERPL-CCNC: 50 U/L (ref 39–117)
ALT SERPL W P-5'-P-CCNC: 28 U/L (ref 1–41)
ANION GAP SERPL CALCULATED.3IONS-SCNC: 14.2 MMOL/L (ref 5–15)
AST SERPL-CCNC: 18 U/L (ref 1–40)
BACTERIA UR QL AUTO: ABNORMAL /HPF
BASOPHILS # BLD AUTO: 0.03 10*3/MM3 (ref 0–0.2)
BASOPHILS NFR BLD AUTO: 0.2 % (ref 0–1.5)
BILIRUB SERPL-MCNC: 0.9 MG/DL (ref 0–1.2)
BILIRUB UR QL STRIP: NEGATIVE
BUN SERPL-MCNC: 16 MG/DL (ref 6–20)
BUN/CREAT SERPL: 13.8 (ref 7–25)
CALCIUM SPEC-SCNC: 9.2 MG/DL (ref 8.6–10.5)
CHLORIDE SERPL-SCNC: 100 MMOL/L (ref 98–107)
CLARITY UR: ABNORMAL
CO2 SERPL-SCNC: 20.8 MMOL/L (ref 22–29)
COLOR UR: YELLOW
CREAT SERPL-MCNC: 1.16 MG/DL (ref 0.76–1.27)
CRP SERPL-MCNC: 2.31 MG/DL (ref 0–0.5)
D-LACTATE SERPL-SCNC: 1.9 MMOL/L (ref 0.5–2)
DEPRECATED RDW RBC AUTO: 37.5 FL (ref 37–54)
EGFRCR SERPLBLD CKD-EPI 2021: 82.2 ML/MIN/1.73
EOSINOPHIL # BLD AUTO: 0 10*3/MM3 (ref 0–0.4)
EOSINOPHIL NFR BLD AUTO: 0 % (ref 0.3–6.2)
ERYTHROCYTE [DISTWIDTH] IN BLOOD BY AUTOMATED COUNT: 12.4 % (ref 12.3–15.4)
GLOBULIN UR ELPH-MCNC: 2.8 GM/DL
GLUCOSE SERPL-MCNC: 133 MG/DL (ref 65–99)
GLUCOSE UR STRIP-MCNC: NEGATIVE MG/DL
HCT VFR BLD AUTO: 42.9 % (ref 37.5–51)
HGB BLD-MCNC: 15 G/DL (ref 13–17.7)
HGB UR QL STRIP.AUTO: ABNORMAL
HYALINE CASTS UR QL AUTO: ABNORMAL /LPF
IMM GRANULOCYTES # BLD AUTO: 0.08 10*3/MM3 (ref 0–0.05)
IMM GRANULOCYTES NFR BLD AUTO: 0.5 % (ref 0–0.5)
KETONES UR QL STRIP: ABNORMAL
LEUKOCYTE ESTERASE UR QL STRIP.AUTO: ABNORMAL
LIPASE SERPL-CCNC: 22 U/L (ref 13–60)
LYMPHOCYTES # BLD AUTO: 0.94 10*3/MM3 (ref 0.7–3.1)
LYMPHOCYTES NFR BLD AUTO: 5.8 % (ref 19.6–45.3)
MCH RBC QN AUTO: 29.4 PG (ref 26.6–33)
MCHC RBC AUTO-ENTMCNC: 35 G/DL (ref 31.5–35.7)
MCV RBC AUTO: 84 FL (ref 79–97)
MONOCYTES # BLD AUTO: 0.91 10*3/MM3 (ref 0.1–0.9)
MONOCYTES NFR BLD AUTO: 5.7 % (ref 5–12)
NEUTROPHILS NFR BLD AUTO: 14.12 10*3/MM3 (ref 1.7–7)
NEUTROPHILS NFR BLD AUTO: 87.8 % (ref 42.7–76)
NITRITE UR QL STRIP: NEGATIVE
NRBC BLD AUTO-RTO: 0 /100 WBC (ref 0–0.2)
PH UR STRIP.AUTO: 5.5 [PH] (ref 5–8)
PLATELET # BLD AUTO: 118 10*3/MM3 (ref 140–450)
PMV BLD AUTO: 13.4 FL (ref 6–12)
POTASSIUM SERPL-SCNC: 3.7 MMOL/L (ref 3.5–5.2)
PROT SERPL-MCNC: 7.4 G/DL (ref 6–8.5)
PROT UR QL STRIP: ABNORMAL
RBC # BLD AUTO: 5.11 10*6/MM3 (ref 4.14–5.8)
RBC # UR STRIP: ABNORMAL /HPF
REF LAB TEST METHOD: ABNORMAL
SODIUM SERPL-SCNC: 135 MMOL/L (ref 136–145)
SP GR UR STRIP: 1.02 (ref 1–1.03)
SQUAMOUS #/AREA URNS HPF: ABNORMAL /HPF
UROBILINOGEN UR QL STRIP: ABNORMAL
WBC # UR STRIP: ABNORMAL /HPF
WBC NRBC COR # BLD AUTO: 16.08 10*3/MM3 (ref 3.4–10.8)

## 2024-01-30 PROCEDURE — 25010000002 KETOROLAC TROMETHAMINE PER 15 MG: Performed by: STUDENT IN AN ORGANIZED HEALTH CARE EDUCATION/TRAINING PROGRAM

## 2024-01-30 PROCEDURE — 87088 URINE BACTERIA CULTURE: CPT | Performed by: STUDENT IN AN ORGANIZED HEALTH CARE EDUCATION/TRAINING PROGRAM

## 2024-01-30 PROCEDURE — 86140 C-REACTIVE PROTEIN: CPT | Performed by: STUDENT IN AN ORGANIZED HEALTH CARE EDUCATION/TRAINING PROGRAM

## 2024-01-30 PROCEDURE — 87086 URINE CULTURE/COLONY COUNT: CPT | Performed by: STUDENT IN AN ORGANIZED HEALTH CARE EDUCATION/TRAINING PROGRAM

## 2024-01-30 PROCEDURE — 85025 COMPLETE CBC W/AUTO DIFF WBC: CPT | Performed by: STUDENT IN AN ORGANIZED HEALTH CARE EDUCATION/TRAINING PROGRAM

## 2024-01-30 PROCEDURE — 81001 URINALYSIS AUTO W/SCOPE: CPT | Performed by: STUDENT IN AN ORGANIZED HEALTH CARE EDUCATION/TRAINING PROGRAM

## 2024-01-30 PROCEDURE — 25510000001 IOPAMIDOL 61 % SOLUTION: Performed by: STUDENT IN AN ORGANIZED HEALTH CARE EDUCATION/TRAINING PROGRAM

## 2024-01-30 PROCEDURE — 25810000003 SODIUM CHLORIDE 0.9 % SOLUTION: Performed by: STUDENT IN AN ORGANIZED HEALTH CARE EDUCATION/TRAINING PROGRAM

## 2024-01-30 PROCEDURE — 83605 ASSAY OF LACTIC ACID: CPT | Performed by: STUDENT IN AN ORGANIZED HEALTH CARE EDUCATION/TRAINING PROGRAM

## 2024-01-30 PROCEDURE — 87186 SC STD MICRODIL/AGAR DIL: CPT | Performed by: STUDENT IN AN ORGANIZED HEALTH CARE EDUCATION/TRAINING PROGRAM

## 2024-01-30 PROCEDURE — 96374 THER/PROPH/DIAG INJ IV PUSH: CPT

## 2024-01-30 PROCEDURE — 83690 ASSAY OF LIPASE: CPT | Performed by: STUDENT IN AN ORGANIZED HEALTH CARE EDUCATION/TRAINING PROGRAM

## 2024-01-30 PROCEDURE — 80053 COMPREHEN METABOLIC PANEL: CPT | Performed by: STUDENT IN AN ORGANIZED HEALTH CARE EDUCATION/TRAINING PROGRAM

## 2024-01-30 PROCEDURE — 99285 EMERGENCY DEPT VISIT HI MDM: CPT

## 2024-01-30 PROCEDURE — 74177 CT ABD & PELVIS W/CONTRAST: CPT

## 2024-01-30 RX ORDER — KETOROLAC TROMETHAMINE 30 MG/ML
15 INJECTION, SOLUTION INTRAMUSCULAR; INTRAVENOUS ONCE
Status: COMPLETED | OUTPATIENT
Start: 2024-01-30 | End: 2024-01-30

## 2024-01-30 RX ORDER — CEPHALEXIN 250 MG/1
500 CAPSULE ORAL ONCE
Status: DISCONTINUED | OUTPATIENT
Start: 2024-01-30 | End: 2024-01-30

## 2024-01-30 RX ORDER — CEFUROXIME AXETIL 250 MG/1
500 TABLET ORAL ONCE
Status: DISCONTINUED | OUTPATIENT
Start: 2024-01-30 | End: 2024-01-30

## 2024-01-30 RX ADMIN — KETOROLAC TROMETHAMINE 15 MG: 30 INJECTION, SOLUTION INTRAMUSCULAR; INTRAVENOUS at 19:54

## 2024-01-30 RX ADMIN — IOPAMIDOL 100 ML: 612 INJECTION, SOLUTION INTRAVENOUS at 20:02

## 2024-01-30 RX ADMIN — SODIUM CHLORIDE 1000 ML: 9 INJECTION, SOLUTION INTRAVENOUS at 19:54

## 2024-01-30 NOTE — Clinical Note
[facility] EMERGENCY DEPARTMENT  [address] [address]  Phone: [phone]    Khang Caruso was seen and treated in our emergency department on 1/30/2024.  He may return to work on 02/03/2024.         Thank you for choosing [facility].    Damián Evans MD

## 2024-01-31 NOTE — ED PROVIDER NOTES
Subjective:  History of Present Illness:    Patient is a 38-year-old male with history of gout presents today with dysuria, left leg pain.  Reports onset of symptoms today.  Denies fevers at home.  Reports that he has had recurrent UTIs and pyelonephritis in the past, was concern for this possibility given his worsening function over the day and presents to our emergency department.  Denies any measured fevers at home.  No chest pain or shortness of breath.  Denies any abdominal pain otherwise.  No vomiting, diarrhea.  Endorses nausea just prior to arrival.  Does state that there was blood in his urine when he visited an urgent clinic earlier in the day.  Denies any obvious hematuria.  Denies any change in his bowel habits and no blood in the stool.      Nurses Notes reviewed and agree, including vitals, allergies, social history and prior medical history.     REVIEW OF SYSTEMS: All systems reviewed and not pertinent unless noted.  Review of Systems   Constitutional:  Positive for activity change. Negative for appetite change, chills, fatigue and fever.   HENT:  Negative for congestion, sinus pressure, sneezing and trouble swallowing.    Eyes:  Negative for discharge and itching.   Respiratory:  Negative for cough and shortness of breath.    Cardiovascular:  Negative for chest pain and palpitations.   Gastrointestinal:  Negative for abdominal distention and abdominal pain.   Endocrine: Negative for cold intolerance and heat intolerance.   Genitourinary:  Positive for dysuria, flank pain and frequency. Negative for decreased urine volume and urgency.   Musculoskeletal:  Negative for gait problem, neck pain and neck stiffness.   Skin:  Negative for color change and rash.   Allergic/Immunologic: Negative for immunocompromised state.   Neurological:  Negative for facial asymmetry and headaches.   Hematological:  Negative for adenopathy.   Psychiatric/Behavioral:  Negative for self-injury and suicidal ideas.        Past  "Medical History:   Diagnosis Date    ADHD (attention deficit hyperactivity disorder) 1990    Gout     Headache     Urinary tract infection        Allergies:    Patient has no known allergies.      Past Surgical History:   Procedure Laterality Date    NASAL TURBINATE REDUCTION      VASECTOMY  11/2021         Social History     Socioeconomic History    Marital status:    Tobacco Use    Smoking status: Never    Smokeless tobacco: Never   Vaping Use    Vaping Use: Never used   Substance and Sexual Activity    Alcohol use: No    Drug use: No    Sexual activity: Yes     Partners: Female     Birth control/protection: None         Family History   Problem Relation Age of Onset    Arthritis Mother     Heart disease Maternal Grandfather     Diabetes Paternal Grandmother        Objective  Physical Exam:  /63   Pulse (!) 122   Temp 98.6 °F (37 °C) (Oral)   Resp 16   Ht 170.2 cm (67\")   Wt 95.7 kg (211 lb)   SpO2 96%   BMI 33.05 kg/m²      Physical Exam  Constitutional:       General: He is not in acute distress.     Appearance: Normal appearance. He is normal weight. He is not ill-appearing.   HENT:      Head: Normocephalic and atraumatic.      Nose: Nose normal. No congestion or rhinorrhea.      Mouth/Throat:      Mouth: Mucous membranes are moist.      Pharynx: Oropharynx is clear.   Eyes:      Extraocular Movements: Extraocular movements intact.      Conjunctiva/sclera: Conjunctivae normal.      Pupils: Pupils are equal, round, and reactive to light.   Cardiovascular:      Rate and Rhythm: Regular rhythm. Tachycardia present.      Pulses: Normal pulses.   Pulmonary:      Effort: Pulmonary effort is normal. No respiratory distress.      Breath sounds: Normal breath sounds.   Abdominal:      General: Abdomen is flat. Bowel sounds are normal. There is no distension.      Palpations: Abdomen is soft.      Tenderness: There is no abdominal tenderness. There is right CVA tenderness. There is no left CVA " tenderness, guarding or rebound.   Musculoskeletal:         General: No swelling or tenderness. Normal range of motion.      Cervical back: Normal range of motion and neck supple. No rigidity or tenderness.   Skin:     General: Skin is warm and dry.      Capillary Refill: Capillary refill takes less than 2 seconds.   Neurological:      General: No focal deficit present.      Mental Status: He is alert and oriented to person, place, and time. Mental status is at baseline.      Cranial Nerves: No cranial nerve deficit.      Sensory: No sensory deficit.      Motor: No weakness.   Psychiatric:         Mood and Affect: Mood normal.         Behavior: Behavior normal.         Thought Content: Thought content normal.         Judgment: Judgment normal.         Procedures    ED Course:         Lab Results (last 24 hours)       Procedure Component Value Units Date/Time    POC Urinalysis Dipstick, Multipro (Automated Dipstick) [[id number]]  (Abnormal) Resulted: 01/30/24 1540    Specimen: Urine Updated: 01/30/24 1541     Color Yellow     Clarity, UA Cloudy     Glucose, UA Negative mg/dL      Bilirubin Negative     Ketones, UA Negative     Specific Gravity  1.015     Blood, UA Large     pH, Urine 8.0     Protein, POC Negative mg/dL      Urobilinogen, UA 2.0 E.U./dL     Nitrite, UA Negative     Leukocytes Moderate (2+)    Urine Culture - Urine, Urine, Clean Catch [[id number]] Collected: 01/30/24 1600    Specimen: Urine, Clean Catch Updated: 01/30/24 1603    CBC & Differential [[id number]]  (Abnormal) Collected: 01/30/24 1941    Specimen: Blood from Arm, Right Updated: 01/30/24 2029    Narrative:      The following orders were created for panel order CBC & Differential.  Procedure                               Abnormality         Status                     ---------                               -----------         ------                     CBC Auto Differential[[id number]]        Abnormal            Final result               Scan  Slide[[id number]]                                                                    Please view results for these tests on the individual orders.    Comprehensive Metabolic Panel [[id number]]  (Abnormal) Collected: 01/30/24 1941    Specimen: Blood from Arm, Right Updated: 01/30/24 2018     Glucose 133 mg/dL      BUN 16 mg/dL      Creatinine 1.16 mg/dL      Sodium 135 mmol/L      Potassium 3.7 mmol/L      Chloride 100 mmol/L      CO2 20.8 mmol/L      Calcium 9.2 mg/dL      Total Protein 7.4 g/dL      Albumin 4.6 g/dL      ALT (SGPT) 28 U/L      AST (SGOT) 18 U/L      Alkaline Phosphatase 50 U/L      Total Bilirubin 0.9 mg/dL      Globulin 2.8 gm/dL      A/G Ratio 1.6 g/dL      BUN/Creatinine Ratio 13.8     Anion Gap 14.2 mmol/L      eGFR 82.2 mL/min/1.73     Narrative:      GFR Normal >60  Chronic Kidney Disease <60  Kidney Failure <15      Lipase [[id number]]  (Normal) Collected: 01/30/24 1941    Specimen: Blood from Arm, Right Updated: 01/30/24 2018     Lipase 22 U/L     C-reactive Protein [[id number]]  (Abnormal) Collected: 01/30/24 1941    Specimen: Blood from Arm, Right Updated: 01/30/24 2018     C-Reactive Protein 2.31 mg/dL     Lactic Acid, Plasma [[id number]]  (Normal) Collected: 01/30/24 1941    Specimen: Blood from Arm, Right Updated: 01/30/24 2016     Lactate 1.9 mmol/L     CBC Auto Differential [[id number]]  (Abnormal) Collected: 01/30/24 1941    Specimen: Blood from Arm, Right Updated: 01/30/24 2029     WBC 16.08 10*3/mm3      RBC 5.11 10*6/mm3      Hemoglobin 15.0 g/dL      Hematocrit 42.9 %      MCV 84.0 fL      MCH 29.4 pg      MCHC 35.0 g/dL      RDW 12.4 %      RDW-SD 37.5 fl      MPV 13.4 fL      Platelets 118 10*3/mm3      Neutrophil % 87.8 %      Lymphocyte % 5.8 %      Monocyte % 5.7 %      Eosinophil % 0.0 %      Basophil % 0.2 %      Immature Grans % 0.5 %      Neutrophils, Absolute 14.12 10*3/mm3      Lymphocytes, Absolute 0.94 10*3/mm3      Monocytes, Absolute 0.91 10*3/mm3      Eosinophils,  Absolute 0.00 10*3/mm3      Basophils, Absolute 0.03 10*3/mm3      Immature Grans, Absolute 0.08 10*3/mm3      nRBC 0.0 /100 WBC     Urinalysis With Culture If Indicated - Urine, Clean Catch [[id number]]  (Abnormal) Collected: 01/30/24 1954    Specimen: Urine, Clean Catch Updated: 01/30/24 2029     Color, UA Yellow     Appearance, UA Turbid     pH, UA 5.5     Specific Gravity, UA 1.021     Glucose, UA Negative     Ketones, UA Trace     Bilirubin, UA Negative     Blood, UA Large (3+)     Protein, UA 30 mg/dL (1+)     Leuk Esterase, UA Large (3+)     Nitrite, UA Negative     Urobilinogen, UA 1.0 E.U./dL    Narrative:      In absence of clinical symptoms, the presence of pyuria, bacteria, and/or nitrites on the urinalysis result does not correlate with infection.    Urinalysis, Microscopic Only - Urine, Clean Catch [[id number]]  (Abnormal) Collected: 01/30/24 1954    Specimen: Urine, Clean Catch Updated: 01/30/24 2055     RBC, UA 21-50 /HPF      WBC, UA 21-50 /HPF      Bacteria, UA 1+ /HPF      Squamous Epithelial Cells, UA 3-6 /HPF      Hyaline Casts, UA None Seen /LPF      Methodology Manual Light Microscopy    Urine Culture - Urine, Urine, Clean Catch [[id number]] Collected: 01/30/24 1954    Specimen: Urine, Clean Catch Updated: 01/30/24 2055             CT Abdomen Pelvis With Contrast    Result Date: 1/30/2024  FINAL REPORT TECHNIQUE: null CLINICAL HISTORY: Left flank pain, history of pyelonephritis per the patient and recurrent UTI, COMPARISON: null FINDINGS: CT abdomen and pelvis with contrast Comparison: None Findings: No consolidation or effusion. The liver enhances homogeneously. The gallbladder is unremarkable. There is no biliary dilatation. The portal and splenic veins appear patent. Enlarged spleen at 14.1 cm axial dimension. Pancreas and adrenal glands are unremarkable. Both kidneys enhance symmetrically without hydroureteronephrosis. No bowel obstruction, pneumoperitoneum, or pneumatosis. The appendix is  normal. The abdominal aorta is not aneurysmal. There are no enlarged abdominal or pelvic lymph nodes. Circumferential urinary bladder wall thickening without perivesical stranding. Prostate is unremarkable. No pelvic free fluid. No free fluid in the pelvis. There are no aggressive osseous lesions.     Impression: IMPRESSION: 1. Circumferential urinary bladder wall thickening without perivesical stranding could be related to partial underdistention, but can be correlated for mild cystitis. 2. No other acute inflammatory findings. Appendix is normal. 3. Nonspecific mild splenomegaly. Authenticated and Electronically Signed by Tyson Smith MD on 01/30/2024 08:42:16 PM        MDM      Initial impression of presenting illness: Left leg pain    DDX: includes but is not limited to: Nephrolithiasis, pyelonephritis, urinary tract infection, diverticulitis    Patient arrives stable with vitals interpreted by myself.     Pertinent features from physical exam: Clear to auscultation, tachycardic with regular rhythm, nontender to abdominal palpation, left CVA tenderness.    Initial diagnostic plan: CBC, CMP, lipase, UA, CRP, lactic acid, CT abdomen pelvis    Results from initial plan were reviewed and interpreted by me revealing patient UA and CT scan consistent with cystitis with no concern for ascending infection    Diagnostic information from other sources: Reviewed past medical records    Interventions / Re-evaluation: Given Toradol, IV fluids for symptom control, given dose of patient's oral antibiotic prescribed from urgent clinic while in the emergency department    Results/clinical rationale were discussed with patient at bedside    Consultations/Discussion of results with other physicians: Discussed diagnosis of cystitis and encourage patient to continue to take outpatient antibiotics already prescribed and follow-up with primary care doctor to ensure resolution of symptoms.  Strict turn precaution for any fever, severe  flank pain and spite antibiotic therapy.  Given my concern for patient's recurrent urinary tract infections in a gentleman that has no history for such, I have sent referral for further evaluation by urology.    Disposition plan: Discharge  -----        Final diagnoses:   Acute cystitis with hematuria   Recurrent UTI          Damián Evans MD  01/31/24 0041

## 2024-01-31 NOTE — DISCHARGE INSTRUCTIONS
You were evaluated due to urinary symptoms and flank pain.  We got labs and a CT scan that showed no concern for infection of your kidney but does show signs and symptoms of a urinary tract infection.  You are now stable for discharge.  We would recommend continue to take the antibiotic, we have given your first dose here in the emergency department.  We also recommend that you follow-up with your primary care doctor to ensure the symptoms improve appropriately.  If you have fevers in spite of antibiotic therapy please come back to emergency department further evaluation.  You are now stable for discharge.   alert

## 2024-02-01 LAB — BACTERIA SPEC AEROBE CULT: ABNORMAL

## 2024-02-02 ENCOUNTER — HOSPITAL ENCOUNTER (EMERGENCY)
Facility: HOSPITAL | Age: 39
Discharge: HOME | End: 2024-02-02
Attending: EMERGENCY MEDICINE
Payer: COMMERCIAL

## 2024-02-02 VITALS
DIASTOLIC BLOOD PRESSURE: 90 MMHG | BODY MASS INDEX: 33.12 KG/M2 | WEIGHT: 211 LBS | HEART RATE: 96 BPM | SYSTOLIC BLOOD PRESSURE: 142 MMHG | HEIGHT: 67 IN | TEMPERATURE: 99.1 F | OXYGEN SATURATION: 97 % | RESPIRATION RATE: 14 BRPM

## 2024-02-02 DIAGNOSIS — N12 PYELONEPHRITIS: Primary | ICD-10-CM

## 2024-02-02 DIAGNOSIS — R10.9 ACUTE FLANK PAIN: ICD-10-CM

## 2024-02-02 LAB
ALBUMIN SERPL-MCNC: 4 G/DL (ref 3.5–5.2)
ALBUMIN/GLOB SERPL: 1.3 G/DL
ALP SERPL-CCNC: 57 U/L (ref 39–117)
ALT SERPL W P-5'-P-CCNC: 54 U/L (ref 1–41)
ANION GAP SERPL CALCULATED.3IONS-SCNC: 11.5 MMOL/L (ref 5–15)
AST SERPL-CCNC: 31 U/L (ref 1–40)
BACTERIA UR QL AUTO: ABNORMAL /HPF
BASOPHILS # BLD AUTO: 0.03 10*3/MM3 (ref 0–0.2)
BASOPHILS NFR BLD AUTO: 0.5 % (ref 0–1.5)
BILIRUB SERPL-MCNC: 0.5 MG/DL (ref 0–1.2)
BILIRUB UR QL STRIP: NEGATIVE
BUN SERPL-MCNC: 9 MG/DL (ref 6–20)
BUN/CREAT SERPL: 8.9 (ref 7–25)
CALCIUM SPEC-SCNC: 8.7 MG/DL (ref 8.6–10.5)
CHLORIDE SERPL-SCNC: 102 MMOL/L (ref 98–107)
CLARITY UR: CLEAR
CO2 SERPL-SCNC: 22.5 MMOL/L (ref 22–29)
COLOR UR: YELLOW
CREAT SERPL-MCNC: 1.01 MG/DL (ref 0.76–1.27)
DEPRECATED RDW RBC AUTO: 37.4 FL (ref 37–54)
EGFRCR SERPLBLD CKD-EPI 2021: 97 ML/MIN/1.73
EOSINOPHIL # BLD AUTO: 0.01 10*3/MM3 (ref 0–0.4)
EOSINOPHIL NFR BLD AUTO: 0.2 % (ref 0.3–6.2)
ERYTHROCYTE [DISTWIDTH] IN BLOOD BY AUTOMATED COUNT: 12.3 % (ref 12.3–15.4)
GLOBULIN UR ELPH-MCNC: 3 GM/DL
GLUCOSE SERPL-MCNC: 108 MG/DL (ref 65–99)
GLUCOSE UR STRIP-MCNC: NEGATIVE MG/DL
HCT VFR BLD AUTO: 39.4 % (ref 37.5–51)
HGB BLD-MCNC: 13.7 G/DL (ref 13–17.7)
HGB UR QL STRIP.AUTO: ABNORMAL
HYALINE CASTS UR QL AUTO: ABNORMAL /LPF
IMM GRANULOCYTES # BLD AUTO: 0.06 10*3/MM3 (ref 0–0.05)
IMM GRANULOCYTES NFR BLD AUTO: 1 % (ref 0–0.5)
KETONES UR QL STRIP: NEGATIVE
LEUKOCYTE ESTERASE UR QL STRIP.AUTO: ABNORMAL
LYMPHOCYTES # BLD AUTO: 0.8 10*3/MM3 (ref 0.7–3.1)
LYMPHOCYTES NFR BLD AUTO: 13.6 % (ref 19.6–45.3)
MCH RBC QN AUTO: 29 PG (ref 26.6–33)
MCHC RBC AUTO-ENTMCNC: 34.8 G/DL (ref 31.5–35.7)
MCV RBC AUTO: 83.5 FL (ref 79–97)
MONOCYTES # BLD AUTO: 0.48 10*3/MM3 (ref 0.1–0.9)
MONOCYTES NFR BLD AUTO: 8.2 % (ref 5–12)
NEUTROPHILS NFR BLD AUTO: 4.49 10*3/MM3 (ref 1.7–7)
NEUTROPHILS NFR BLD AUTO: 76.5 % (ref 42.7–76)
NITRITE UR QL STRIP: NEGATIVE
NRBC BLD AUTO-RTO: 0 /100 WBC (ref 0–0.2)
PH UR STRIP.AUTO: 5.5 [PH] (ref 5–8)
PLATELET # BLD AUTO: 98 10*3/MM3 (ref 140–450)
PMV BLD AUTO: 12.9 FL (ref 6–12)
POTASSIUM SERPL-SCNC: 3.8 MMOL/L (ref 3.5–5.2)
PROT SERPL-MCNC: 7 G/DL (ref 6–8.5)
PROT UR QL STRIP: ABNORMAL
RBC # BLD AUTO: 4.72 10*6/MM3 (ref 4.14–5.8)
RBC # UR STRIP: ABNORMAL /HPF
REF LAB TEST METHOD: ABNORMAL
SODIUM SERPL-SCNC: 136 MMOL/L (ref 136–145)
SP GR UR STRIP: 1.02 (ref 1–1.03)
SQUAMOUS #/AREA URNS HPF: ABNORMAL /HPF
UROBILINOGEN UR QL STRIP: ABNORMAL
WBC # UR STRIP: ABNORMAL /HPF
WBC NRBC COR # BLD AUTO: 5.87 10*3/MM3 (ref 3.4–10.8)

## 2024-02-02 PROCEDURE — 81001 URINALYSIS AUTO W/SCOPE: CPT | Performed by: EMERGENCY MEDICINE

## 2024-02-02 PROCEDURE — 85025 COMPLETE CBC W/AUTO DIFF WBC: CPT | Performed by: EMERGENCY MEDICINE

## 2024-02-02 PROCEDURE — 87086 URINE CULTURE/COLONY COUNT: CPT | Performed by: EMERGENCY MEDICINE

## 2024-02-02 PROCEDURE — 36415 COLL VENOUS BLD VENIPUNCTURE: CPT

## 2024-02-02 PROCEDURE — 99283 EMERGENCY DEPT VISIT LOW MDM: CPT

## 2024-02-02 PROCEDURE — 25810000003 SODIUM CHLORIDE 0.9 % SOLUTION: Performed by: EMERGENCY MEDICINE

## 2024-02-02 PROCEDURE — 80053 COMPREHEN METABOLIC PANEL: CPT | Performed by: EMERGENCY MEDICINE

## 2024-02-02 RX ORDER — SODIUM CHLORIDE 0.9 % (FLUSH) 0.9 %
10 SYRINGE (ML) INJECTION AS NEEDED
Status: DISCONTINUED | OUTPATIENT
Start: 2024-02-02 | End: 2024-02-02 | Stop reason: HOSPADM

## 2024-02-02 RX ORDER — IBUPROFEN 600 MG/1
600 TABLET ORAL ONCE
Status: COMPLETED | OUTPATIENT
Start: 2024-02-02 | End: 2024-02-02

## 2024-02-02 RX ADMIN — IBUPROFEN 600 MG: 600 TABLET ORAL at 08:16

## 2024-02-02 RX ADMIN — SODIUM CHLORIDE 1000 ML: 9 INJECTION, SOLUTION INTRAVENOUS at 08:18

## 2024-02-02 NOTE — Clinical Note
[facility] EMERGENCY DEPARTMENT  [address] [address]  Phone: [phone]    Khang Caruso was seen and treated in our emergency department on 2/2/2024.  He may return to work on 02/05/2024.         Thank you for choosing [facility].    Huey Infante MD

## 2024-02-02 NOTE — DISCHARGE INSTRUCTIONS
Complete antibiotics as prescribed.     Follow-up with PCP for recheck in 2-3 days.    Return to ER with further concerns.

## 2024-02-02 NOTE — ED PROVIDER NOTES
Subjective   History of Present Illness  38-year-old male who presents with a complaint of continued chills with lower abdominal pain and low back pain.  The patient actually states that he presented here on Tuesday, 3 days ago.  He was diagnosed with urinary tract infection at that given time and discharged with antibiotics.  He has been taking the antibiotics as prescribed.  He had lower abdominal pain which also went to the lower back and bilateral flank region that given time.  He had a CT scan that was negative for kidney stone or other acute abnormalities.  His urine was concerning for urinary tract infection he was discharged with cefuroxime which he has been taking.  His urine culture has now returned and does show E. coli that is pansensitive.  Therefore cefuroxime is considered the right antibiotic.  He reports continued intermittent episodes of chills and continued low-grade lower abdominal pain and back pain.  He actually states that the overall symptoms have improved.  It was the continued intermittent chills which led to the current presentation.  He does report some upper respiratory congestion.  He denies obvious sick contacts.  No chest pain, or shortness of breath.  No other acute complaints.  No previous surgeries to the abdomen.  No history of kidney stones.      Review of Systems   Constitutional:  Positive for appetite change, chills and fatigue. Negative for fever.   HENT:  Negative for congestion, ear pain, postnasal drip, sinus pressure and sore throat.    Eyes:  Negative for pain, redness and visual disturbance.   Respiratory:  Negative for cough, chest tightness and shortness of breath.    Cardiovascular:  Negative for chest pain, palpitations and leg swelling.   Gastrointestinal:  Positive for abdominal pain and nausea. Negative for anal bleeding, blood in stool, diarrhea and vomiting.   Endocrine: Negative for polydipsia and polyuria.   Genitourinary:  Positive for flank pain. Negative  for difficulty urinating, dysuria, frequency and urgency.   Musculoskeletal:  Positive for back pain. Negative for arthralgias and neck pain.   Skin:  Negative for pallor and rash.   Allergic/Immunologic: Negative for environmental allergies and immunocompromised state.   Neurological:  Negative for dizziness, weakness and headaches.   Hematological:  Negative for adenopathy.   Psychiatric/Behavioral:  Negative for confusion, self-injury and suicidal ideas. The patient is not nervous/anxious.    All other systems reviewed and are negative.      Past Medical History:   Diagnosis Date    ADHD (attention deficit hyperactivity disorder) 1990    Gout     Headache     Urinary tract infection        No Known Allergies    Past Surgical History:   Procedure Laterality Date    NASAL TURBINATE REDUCTION      VASECTOMY  11/2021       Family History   Problem Relation Age of Onset    Arthritis Mother     Heart disease Maternal Grandfather     Diabetes Paternal Grandmother        Social History     Socioeconomic History    Marital status:    Tobacco Use    Smoking status: Never    Smokeless tobacco: Never   Vaping Use    Vaping Use: Never used   Substance and Sexual Activity    Alcohol use: No    Drug use: No    Sexual activity: Yes     Partners: Female     Birth control/protection: None           Objective   Physical Exam  Vitals and nursing note reviewed.   Constitutional:       General: He is not in acute distress.     Appearance: Normal appearance. He is well-developed. He is not toxic-appearing or diaphoretic.   HENT:      Head: Normocephalic and atraumatic.      Right Ear: External ear normal.      Left Ear: External ear normal.      Nose: Nose normal.   Eyes:      General: Lids are normal.      Pupils: Pupils are equal, round, and reactive to light.   Neck:      Trachea: No tracheal deviation.   Cardiovascular:      Rate and Rhythm: Normal rate and regular rhythm.      Pulses: No decreased pulses.      Heart sounds:  Normal heart sounds. No murmur heard.     No friction rub. No gallop.   Pulmonary:      Effort: Pulmonary effort is normal. No respiratory distress.      Breath sounds: Normal breath sounds. No decreased breath sounds, wheezing, rhonchi or rales.   Abdominal:      General: Bowel sounds are normal.      Palpations: Abdomen is soft.      Tenderness: There is no abdominal tenderness in the right lower quadrant, suprapubic area and left lower quadrant. There is right CVA tenderness and left CVA tenderness. There is no guarding or rebound.      Comments: Mildly tender lower abdominal pain.    Mild tenderness to percussion over the bilateral flank.   Musculoskeletal:         General: No deformity. Normal range of motion.      Cervical back: Normal range of motion and neck supple.   Lymphadenopathy:      Cervical: No cervical adenopathy.   Skin:     General: Skin is warm and dry.      Findings: No rash.   Neurological:      Mental Status: He is alert and oriented to person, place, and time.      Cranial Nerves: No cranial nerve deficit.      Sensory: No sensory deficit.   Psychiatric:         Speech: Speech normal.         Behavior: Behavior normal.         Thought Content: Thought content normal.         Judgment: Judgment normal.         Procedures           ED Course                                             Medical Decision Making  Differential diagnosis includes diverticulitis, abscess, perforation, bowel obstruction, urinary tract infection, other unspecified etiology.    Previous CT scan performed within the last 2 days shows negative for kidney stone or other acute abnormalities.  The patient has been passing gas normally.  Abdominal exam is relatively benign.  Repeat evaluation shows significant improved white count and white blood cell differential.  Normal H&H.  Normal kidney function.  Normal electrolytes.  Urine also appears significantly only trace bacteria and 10 white blood cells.    The patient is felt  to be in the right antibiotic as the previous cultures show  E. coli that is pansensitive.  He overall appears well.  He was given pain medication, ibuprofen and IV fluids    Utilize drinking fluids and complete current prescribed antibiotics.    Follow-up with primary care physician for recheck in 2 to 3 days.    Problems Addressed:  Acute flank pain: complicated acute illness or injury with systemic symptoms  Pyelonephritis: complicated acute illness or injury with systemic symptoms that poses a threat to life or bodily functions    Amount and/or Complexity of Data Reviewed  Independent Historian: spouse  External Data Reviewed: labs and radiology.     Details: Previous CT scan reviewed showed no kidney stones.  Previous labs reviewed and show leukocytosis.  Improved on current evaluation.  Labs: ordered. Decision-making details documented in ED Course.    Risk  Prescription drug management.        Final diagnoses:   Pyelonephritis   Acute flank pain       ED Disposition  ED Disposition       ED Disposition   Discharge    Condition   Stable    Comment   --               Corry Green, APRN  [address] [facility] [address]  [facility] [address] [phone]    In 3 days           Medication List      No changes were made to your prescriptions during this visit.            Huey Infante MD  02/04/24 4091

## 2024-02-03 LAB — BACTERIA SPEC AEROBE CULT: NO GROWTH

## 2024-02-08 ENCOUNTER — OFFICE VISIT (OUTPATIENT)
Dept: INTERNAL MEDICINE | Facility: CLINIC | Age: 39
End: 2024-02-08
Payer: COMMERCIAL

## 2024-02-08 VITALS
TEMPERATURE: 97.7 F | WEIGHT: 207 LBS | RESPIRATION RATE: 18 BRPM | BODY MASS INDEX: 32.49 KG/M2 | SYSTOLIC BLOOD PRESSURE: 136 MMHG | HEART RATE: 76 BPM | DIASTOLIC BLOOD PRESSURE: 77 MMHG | HEIGHT: 67 IN | OXYGEN SATURATION: 99 %

## 2024-02-08 DIAGNOSIS — R39.9 URINARY SYMPTOM OR SIGN: Primary | ICD-10-CM

## 2024-02-08 LAB
BILIRUB BLD-MCNC: NEGATIVE MG/DL
CLARITY, POC: CLEAR
COLOR UR: YELLOW
EXPIRATION DATE: NORMAL
GLUCOSE UR STRIP-MCNC: NEGATIVE MG/DL
KETONES UR QL: NEGATIVE
LEUKOCYTE EST, POC: NEGATIVE
Lab: NORMAL
NITRITE UR-MCNC: NEGATIVE MG/ML
PH UR: 6 [PH] (ref 5–8)
PROT UR STRIP-MCNC: NEGATIVE MG/DL
RBC # UR STRIP: NEGATIVE /UL
SP GR UR: 1 (ref 1–1.03)
UROBILINOGEN UR QL: NORMAL

## 2024-02-08 PROCEDURE — 81003 URINALYSIS AUTO W/O SCOPE: CPT | Performed by: NURSE PRACTITIONER

## 2024-02-08 PROCEDURE — 99213 OFFICE O/P EST LOW 20 MIN: CPT | Performed by: NURSE PRACTITIONER

## 2024-02-08 NOTE — PROGRESS NOTES
Chief Complaint / Reason:      Chief Complaint   Patient presents with    Urinary Tract Infection     ER f/u        Subjective     HPI    The patient is a 38-year-old male who presents for follow-up after emergency room visit with urinary tract infection.    He has been experiencing recurrent urinary tract infections. He was having severe discomfort Tuesday, 01/30/2024. He presented to the emergency room that evening with a fever of 106 degrees Fahrenheit and he was shivering violently. He returned to the emergency room on Friday 02/02/2024 as his friend, who is a urologist, informed him that his white blood cells were elevated and he was continuously febrile with increasing temperature. He was experiencing chills, severe dysuria, significant hematuria, and his hands were swollen. He describes nearly blacking out from pain when urinating. He denies STDs or constipation. He was not tested for influenza. His urine culture was positive for E. coli.  He was hyponatremic. His urologist informed him that his prostate function was within normal limits, but it was unclear if his prostate was enlarged on CT scan. He was not informed that he had splenomegaly. There was bladder wall thickening attributable to infection. His kidneys were normal on imaging. His white blood cell count has been normalizing since 02/02/2024. He completed his antibiotic on 02/06/2024. He has been hydrating as well as drinking pure cranberry or tart cherry juice 1 time daily. He denies any flank pain today. He has a follow-up with urology 04/2024.    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 He has been playing hockey and performing cardiovascular exercise. He has lost weight; however, he feels that his weight is overall stable. He does not think he is incorporating adequate protein in his diet. He has is no longer consuming red meat. He is not consuming a significant amount of carbohydrates. He denies any swelling in his  feet.    History taken from: patient    PMH/FH/Social History were reviewed and updated appropriately in the electronic medical record.   Past Medical History:   Diagnosis Date    ADHD (attention deficit hyperactivity disorder) 1990    Gout     Headache     Urinary tract infection      Past Surgical History:   Procedure Laterality Date    NASAL TURBINATE REDUCTION      VASECTOMY  11/2021     Social History     Socioeconomic History    Marital status:    Tobacco Use    Smoking status: Never    Smokeless tobacco: Never   Vaping Use    Vaping Use: Never used   Substance and Sexual Activity    Alcohol use: No    Drug use: No    Sexual activity: Yes     Partners: Female     Birth control/protection: None     Family History   Problem Relation Age of Onset    Arthritis Mother     Heart disease Maternal Grandfather     Diabetes Paternal Grandmother        Review of Systems:   Review of Systems      All other systems were reviewed and are negative.  Exceptions are noted in the subjective or above.      Objective     Vital Signs  Vitals:    02/08/24 1554   BP: 136/77   Pulse: 76   Resp: 18   Temp: 97.7 °F (36.5 °C)   SpO2: 99%       Body mass index is 32.42 kg/m².  BMI is >= 30 and <35. (Class 1 Obesity). The following options were offered after discussion;: exercise counseling/recommendations and nutrition counseling/recommendations       Physical Exam  Vitals and nursing note reviewed.   Constitutional:       Appearance: He is well-developed. He is obese.   Cardiovascular:      Rate and Rhythm: Normal rate and regular rhythm.      Pulses: Normal pulses.      Heart sounds: Normal heart sounds.   Pulmonary:      Effort: Pulmonary effort is normal.      Breath sounds: Normal breath sounds.   Chest:      Chest wall: No tenderness.   Abdominal:      General: Bowel sounds are normal.      Palpations: Abdomen is soft.      Tenderness: There is no abdominal tenderness. There is no right CVA tenderness or left CVA tenderness.    Skin:     General: Skin is warm and dry.      Capillary Refill: Capillary refill takes less than 2 seconds.   Neurological:      Mental Status: He is alert and oriented to person, place, and time.   Psychiatric:         Behavior: Behavior normal.         Thought Content: Thought content normal.         Judgment: Judgment normal.              Results Review:    I reviewed the patient's new clinical results.       Medication Review:   No current outpatient medications on file.    Diagnoses and all orders for this visit:    Urinary symptom or sign  Comments:  He can take a stool softener and he will maintain hydration. Urine will be sent for culture.If his symptoms do not improve, we will refer him to a nephrologist.  Orders:  -     POCT urinalysis dipstick, automated          Return if symptoms worsen or fail to improve.    CLAUDIA Ferris  02/08/2024      Transcribed from ambient dictation for CLAUDIA Ferris by Yuliet Erickson.  02/08/24   21:11 EST    Patient or patient representative verbalized consent to the visit recording.  I have personally performed the services described in this document as transcribed by the above individual, and it is both accurate and complete.

## 2024-04-09 ENCOUNTER — OFFICE VISIT (OUTPATIENT)
Dept: UROLOGY | Facility: CLINIC | Age: 39
End: 2024-04-09
Payer: COMMERCIAL

## 2024-04-09 VITALS
SYSTOLIC BLOOD PRESSURE: 124 MMHG | OXYGEN SATURATION: 100 % | DIASTOLIC BLOOD PRESSURE: 80 MMHG | WEIGHT: 205 LBS | TEMPERATURE: 97.8 F | HEART RATE: 74 BPM | HEIGHT: 67 IN | BODY MASS INDEX: 32.18 KG/M2

## 2024-04-09 DIAGNOSIS — N39.0 RECURRENT UTI: ICD-10-CM

## 2024-04-09 DIAGNOSIS — N39.0 RECURRENT UTI (URINARY TRACT INFECTION): Primary | ICD-10-CM

## 2024-04-09 LAB
BILIRUB BLD-MCNC: NEGATIVE MG/DL
CLARITY, POC: CLEAR
COLOR UR: YELLOW
EXPIRATION DATE: NORMAL
GLUCOSE UR STRIP-MCNC: NEGATIVE MG/DL
KETONES UR QL: NEGATIVE
LEUKOCYTE EST, POC: NEGATIVE
Lab: NORMAL
NITRITE UR-MCNC: NEGATIVE MG/ML
PH UR: 6.5 [PH] (ref 5–8)
PROT UR STRIP-MCNC: NEGATIVE MG/DL
RBC # UR STRIP: NEGATIVE /UL
SP GR UR: 1 (ref 1–1.03)
UROBILINOGEN UR QL: NORMAL

## 2024-04-09 NOTE — PROGRESS NOTES
Office Visit [facility]      Patient Name: Khang Caruso  : 1985   MRN: [id number]     Chief Complaint:   Chief Complaint   Patient presents with    recurrent UTI     New Patient       Referring Provider: Damián Evans MD    History of Present Illness: Khang Caruso is a 39 y.o. male who presents today with complaints of recurrent urinary tract infections.  He admits to having 1-2 per year since having his vasectomy done.  He has presented to his PCP and Urgent care for evaluation where a UA was done but urine was not routinely cultured.      He presented to the ED in January with UTI complaints and a culture was done which revealed E. Coli.  He is unsure if he has every had a kidney stone but reports having pain that was severe when he went to the ER.  CT scan of his abdomen and pelvis revealed circumferential bladder wall thickening perivesical stranding which could be correlated with acute cystitis.  Since then he has started to drink more water than before and has been without signs of a UTI.  His IPSS today is 2.      IPSS Questionnaire (AUA-7):  Over the past month…    1)  How often have you had a sensation of not emptying your bladder completely after you finish urinating?  0 - Not at all   2)  How often have you had to urinate again less than two hours after you finished urinating? 1 - Less than 1 time in 5   3)  How often have you found you stopped and started again several times when you urinated?  0 - Not at all   4) How difficult have you found it to postpone urination?  0 - Not at all   5) How often have you had a weak urinary stream?  0 - Not at all   6) How often have you had to push or strain to begin urination?  0 - Not at all   7) How many times did you most typically get up to urinate from the time you went to bed until the time you got up in the morning?  1 - 1 time   Total score:  0-7 mildly symptomatic    8-19 moderately symptomatic    20-35 severely symptomatic     "    Subjective      Review of System:   Review of Systems   Constitutional: Negative.  Negative for chills, diaphoresis and fever.   Gastrointestinal:  Negative for abdominal pain, constipation, diarrhea, nausea and vomiting.   Genitourinary:  Positive for frequency (occasional) and nocturia (x 1). Negative for decreased urine volume, difficulty urinating, dysuria, flank pain, hematuria, urgency and urinary incontinence.      Past Medical History:   Past Medical History:   Diagnosis Date    ADHD (attention deficit hyperactivity disorder) 1990    Gout     Headache     Urinary tract infection        Past Surgical History:   Past Surgical History:   Procedure Laterality Date    NASAL TURBINATE REDUCTION      VASECTOMY  11/2021       Family History:   Family History   Problem Relation Age of Onset    Arthritis Mother     Heart disease Maternal Grandfather     Diabetes Paternal Grandmother        Social History:   Social History     Socioeconomic History    Marital status:    Tobacco Use    Smoking status: Never     Passive exposure: Never    Smokeless tobacco: Never   Vaping Use    Vaping status: Never Used   Substance and Sexual Activity    Alcohol use: No    Drug use: No    Sexual activity: Yes     Partners: Female     Birth control/protection: None       Medications:   No current outpatient medications on file.    Allergies:   No Known Allergies    Objective     Physical Exam:   Vital Signs:   Vitals:    04/09/24 1515   BP: 124/80   BP Location: Right arm   Patient Position: Sitting   Cuff Size: Adult   Pulse: 74   Temp: 97.8 °F (36.6 °C)   TempSrc: Temporal   SpO2: 100%   Weight: 93 kg (205 lb)   Height: 170.2 cm (67\")     Body mass index is 32.11 kg/m².   Physical Exam  Vitals and nursing note reviewed.   Constitutional:       Appearance: Normal appearance.   HENT:      Head: Normocephalic.   Pulmonary:      Effort: Pulmonary effort is normal.   Skin:     General: Skin is warm.   Neurological:      General: " "No focal deficit present.      Mental Status: He is alert and oriented to person, place, and time.   Psychiatric:         Mood and Affect: Mood normal.         Behavior: Behavior normal.        Labs  No results found for: \"PSA\"    Brief Urine Lab Results  (Last result in the past 365 days)        Color   Clarity   Blood   Leuk Est   Nitrite   Protein   CREAT   Urine HCG        04/09/24 1521 Yellow   Clear   Negative   Negative   Negative   Negative                   PVR  Post-void residual performed by staff - 2 ml      Assessment / Plan      Assessment/Plan   Mr. Caruso is a 39 y.o. male who presents with complaints of recurrent UTIs.  He admits to having 1-2 per year since having his vasectomy.  Last UTI was in January that was positive for E.Coli.  Since then he has been increasing his fluids and overall feels well today.  His PVR is 2 ml.  UA is negative.  I will have Mr. Caruso follow up with me in 1  year to remain established and asked that he follow up sooner should he have UTI symptoms.  He was agreeable to this plan.      Follow Up:   Return in about 1 year (around 4/9/2025) for Next scheduled follow up.    Bailey Cyr, APRN, MSN, FNP-C  [facility] [place]   "

## 2024-09-30 ENCOUNTER — OFFICE VISIT (OUTPATIENT)
Dept: UROLOGY | Facility: CLINIC | Age: 39
End: 2024-09-30
Payer: COMMERCIAL

## 2024-09-30 VITALS
OXYGEN SATURATION: 97 % | BODY MASS INDEX: 32.49 KG/M2 | WEIGHT: 207 LBS | HEIGHT: 67 IN | TEMPERATURE: 97.4 F | DIASTOLIC BLOOD PRESSURE: 84 MMHG | HEART RATE: 80 BPM | SYSTOLIC BLOOD PRESSURE: 128 MMHG

## 2024-09-30 DIAGNOSIS — N39.0 RECURRENT UTI (URINARY TRACT INFECTION): Primary | ICD-10-CM

## 2024-09-30 DIAGNOSIS — M54.50 ACUTE RIGHT-SIDED LOW BACK PAIN WITHOUT SCIATICA: ICD-10-CM

## 2024-09-30 LAB
BILIRUB BLD-MCNC: NEGATIVE MG/DL
CLARITY, POC: CLEAR
COLOR UR: YELLOW
EXPIRATION DATE: NORMAL
GLUCOSE UR STRIP-MCNC: NEGATIVE MG/DL
KETONES UR QL: NEGATIVE
LEUKOCYTE EST, POC: NEGATIVE
Lab: NORMAL
NITRITE UR-MCNC: NEGATIVE MG/ML
PH UR: 6.5 [PH] (ref 5–8)
PROT UR STRIP-MCNC: NEGATIVE MG/DL
RBC # UR STRIP: NEGATIVE /UL
SP GR UR: 1.01 (ref 1–1.03)
UROBILINOGEN UR QL: NORMAL

## 2024-09-30 NOTE — PROGRESS NOTES
Office Visit Established Male Patient     Patient Name: Khang Caruso  : 1985   MRN: [id number]     Chief Complaint:   Chief Complaint   Patient presents with    Follow-up    Urinary Tract Infection       History of Present Illness: Mr. Khang Caruso is a 39 y.o. male who presents with complaints pf increased urinary frequency and right lower back pain for 2 weeks.  Pelvic pressure on Wednesday but has since resolved.  He has never had a cystoscopy for recurrent UTIs in the past.  Continues to have intermittent right lower back pain.      Admits to urinary frequency in the morning and at least once nightly.  Drinks 32 ounces of coffee in the mornings then urinates at least 8 times per day usually.  Has decreased soda intake to 1 per day.      Subjective      Review of System: ROS reviewed by me and is negative unless otherwise noted in HPI.      Past Medical History:   Past Medical History:   Diagnosis Date    ADHD (attention deficit hyperactivity disorder)     Gout     Headache     Urinary tract infection        Past Surgical History:   Past Surgical History:   Procedure Laterality Date    NASAL TURBINATE REDUCTION Bilateral     NASAL TURBINATE REDUCTION Bilateral 2017    VASECTOMY  2021       Family History:   Family History   Problem Relation Age of Onset    Arthritis Mother     Diabetes Paternal Grandmother     Heart disease Maternal Grandfather     Kidney cancer Neg Hx     Prostate cancer Neg Hx        Social History:   Social History     Socioeconomic History    Marital status:    Tobacco Use    Smoking status: Never     Passive exposure: Never    Smokeless tobacco: Never   Vaping Use    Vaping status: Never Used   Substance and Sexual Activity    Alcohol use: No    Drug use: Never    Sexual activity: Yes     Partners: Female     Birth control/protection: Vasectomy       Medications:   No current outpatient medications on file.    Allergies:   No Known Allergies    Objective  "    Physical Exam:   Vital Signs:   Vitals:    09/30/24 1324   BP: 128/84   BP Location: Right arm   Patient Position: Sitting   Cuff Size: Adult   Pulse: 80   Temp: 97.4 °F (36.3 °C)   TempSrc: Temporal   SpO2: 97%   Weight: 93.9 kg (207 lb)   Height: 170.2 cm (67.01\")     Body mass index is 32.41 kg/m².   Physical Exam  Vitals and nursing note reviewed.   Constitutional:       General: He is not in acute distress.     Appearance: Normal appearance. He is not ill-appearing.   HENT:      Head: Normocephalic and atraumatic.   Pulmonary:      Effort: Pulmonary effort is normal.   Skin:     General: Skin is warm and dry.   Neurological:      General: No focal deficit present.      Mental Status: He is alert and oriented to person, place, and time.   Psychiatric:         Mood and Affect: Mood normal.         Behavior: Behavior normal.          Labs  Brief Urine Lab Results  (Last result in the past 365 days)        Color   Clarity   Blood   Leuk Est   Nitrite   Protein   CREAT   Urine HCG        09/30/24 1338 Yellow   Clear   Negative   Negative   Negative   Negative                   Lab Results   Component Value Date    GLUCOSE 108 (H) 02/02/2024    CALCIUM 8.7 02/02/2024     02/02/2024    K 3.8 02/02/2024    CO2 22.5 02/02/2024     02/02/2024    BUN 9 02/02/2024    CREATININE 1.01 02/02/2024    EGFRIFAFRI 87 08/19/2020    EGFRIFNONA 72 08/19/2020    BCR 8.9 02/02/2024    ANIONGAP 11.5 02/02/2024       Lab Results   Component Value Date    WBC 5.87 02/02/2024    HGB 13.7 02/02/2024    HCT 39.4 02/02/2024    MCV 83.5 02/02/2024    PLT 98 (L) 02/02/2024       Urine Culture          1/30/2024    16:00 1/30/2024    19:54 2/2/2024    07:37   Urine Culture   Urine Culture Final report  >100,000 CFU/mL Escherichia coli  No growth         Radiographic Studies  No Images in the past 120 days found..    I have reviewed the above labs and imaging.     Assessment / Plan      Assessment/Plan: Mr. Khang Caruso is a " 39 y.o. male who presents with complaints pf increased urinary frequency and right lower back pain for 2 weeks.  Pelvic pressure on Wednesday but has since resolved.  He has never had a cystoscopy for recurrent UTIs in the past.  Continues to have intermittent right lower back pain.      Admits to urinary frequency in the morning and at least once nightly.  Drinks 32 ounces of coffee in the mornings then urinates at least 8 times per day usually.  Has decreased soda intake to 1 per day.      We discussed that his UA is benign.  Encouraged him to decrease his coffee intake and increase his water intake.  See if symptoms of overactivity improve.  Declines starting an OAB medication at this time however, he was instructed to contact my office in the next 3-6 weeks if symptoms are not improved with cutting down or stopping caffeine intake.  I would like for him to have a cystoscopy with Dr. Cunningham for recurrent UTIs.  He was agreeable to this plan.  Questions/concerns addressed.      Diagnoses and all orders for this visit:    1. Recurrent UTI (urinary tract infection) (Primary)  -     POC Urinalysis Dipstick, Automated    2. Acute right-sided low back pain without sciatica    Follow Up:   Return for with Dr. Cunningham for cystoscopy .    Bailey Cyr, APRN, MSN, FNP-C  [facility] [place]

## 2024-11-07 ENCOUNTER — PROCEDURE VISIT (OUTPATIENT)
Dept: UROLOGY | Facility: CLINIC | Age: 39
End: 2024-11-07
Payer: COMMERCIAL

## 2024-11-07 DIAGNOSIS — N39.0 RECURRENT UTI (URINARY TRACT INFECTION): Primary | ICD-10-CM

## 2024-11-07 NOTE — PROGRESS NOTES
Preprocedure diagnosis  Lower urinary tract symptoms    Postprocedure diagnosis  No major abnormalities    Procedure  Flexible Cystourethroscopy    Attending surgeon  Erik Cunningham MD    Anesthesia  2% lidocaine jelly intraurethrally    Complications  None    Indications  39 y.o. male undergoing a flexible cystoscopy for the above mentioned indications.  Informed consent was obtained.      Findings  Cystoscopic findings included one right and left ureteral orifice in the normal anatomic position with normal bladder mucosa and no tumors, masses or stones. The urethral urothelium was mostly within normal limits with a very mild nonflow limiting bulbar urethral stricture.  There was not a prominent median lobe.  The lateral lobes were not really obstructive in appearance.      Procedure  The patient was placed in supine position and prepped and draped in sterile fashion with lidocaine jelly per urethra for anesthesia.  A timeout was performed.  The 14F flexible cystoscope was lubricated and gently placed through the penile urethra and into the bladder.  The bladder was completely visualized.  The cystoscope was retroflexed and the bladder neck and prostate visualized.  The cystoscope was slowly withdrawn while visualizing the urethra and the procedure terminated.  The patient tolerated the procedure well.      No Images in the past 120 days found..  Prostate 40 cc, no kidney stones on CT scan from January.  1 positive urine culture for UTI from January 30, 2024.  His urinary symptoms have improved significantly since decreasing his large caffeine consumption.  He will follow-up with me as needed.

## 2025-03-03 ENCOUNTER — OFFICE VISIT (OUTPATIENT)
Dept: UROLOGY | Facility: CLINIC | Age: 40
End: 2025-03-03
Payer: COMMERCIAL

## 2025-03-03 VITALS
BODY MASS INDEX: 32.02 KG/M2 | HEIGHT: 67 IN | OXYGEN SATURATION: 97 % | WEIGHT: 204 LBS | SYSTOLIC BLOOD PRESSURE: 126 MMHG | DIASTOLIC BLOOD PRESSURE: 80 MMHG | HEART RATE: 87 BPM | TEMPERATURE: 98 F

## 2025-03-03 DIAGNOSIS — Z87.440 HISTORY OF UTI: ICD-10-CM

## 2025-03-03 DIAGNOSIS — N32.89 BLADDER SPASMS: ICD-10-CM

## 2025-03-03 DIAGNOSIS — Z87.442 HISTORY OF NEPHROLITHIASIS: Primary | ICD-10-CM

## 2025-03-03 LAB
BILIRUB BLD-MCNC: NEGATIVE MG/DL
CLARITY, POC: CLEAR
COLOR UR: YELLOW
EXPIRATION DATE: ABNORMAL
GLUCOSE UR STRIP-MCNC: NEGATIVE MG/DL
KETONES UR QL: NEGATIVE
LEUKOCYTE EST, POC: NEGATIVE
Lab: ABNORMAL
NITRITE UR-MCNC: NEGATIVE MG/ML
PH UR: 7 [PH] (ref 5–8)
PROT UR STRIP-MCNC: ABNORMAL MG/DL
RBC # UR STRIP: NEGATIVE /UL
SP GR UR: 1.02 (ref 1–1.03)
UROBILINOGEN UR QL: ABNORMAL

## 2025-03-03 PROCEDURE — 81003 URINALYSIS AUTO W/O SCOPE: CPT | Performed by: NURSE PRACTITIONER

## 2025-03-03 PROCEDURE — 99214 OFFICE O/P EST MOD 30 MIN: CPT | Performed by: NURSE PRACTITIONER

## 2025-03-03 RX ORDER — TAMSULOSIN HYDROCHLORIDE 0.4 MG/1
1 CAPSULE ORAL DAILY
COMMUNITY
Start: 2025-02-26

## 2025-03-03 RX ORDER — OXYBUTYNIN CHLORIDE 10 MG/1
10 TABLET, EXTENDED RELEASE ORAL DAILY PRN
Qty: 30 TABLET | Refills: 1 | Status: SHIPPED | OUTPATIENT
Start: 2025-03-03

## 2025-03-03 RX ORDER — LEVOFLOXACIN 500 MG/1
500 TABLET, FILM COATED ORAL DAILY
COMMUNITY
Start: 2025-02-26

## 2025-03-03 NOTE — PROGRESS NOTES
Office Visit     Patient Name: Khang Caruso  : 1985   MRN: [id number]   Patient or patient representative verbalized consent for the use of Ambient Listening during the visit with  CLAUDIA Fontenot for chart documentation. 3/3/2025  15:20 EST    Chief Complaint   Patient presents with    Urinary Tract Infection     Referring Provider: No ref. provider found    Primary Care Provider: Corry Green APRN     History of Present Illness  Mr. Caruso is a 39 year old male with concerns for recurrent UTI.      Recurrent UTIs  - History of recurrent UTIs, has had 2 within the past year.    - Initially treated with Augmentin, experienced intermittent symptoms post-treatment.  - Severe discomfort on Tuesday led to a telephone consultation with a urologist friend who prescribed Levaquin and Flomax which he has not started.  - Symptoms fluctuated, with a persistent sensation of urinary incontinence without incontinence. Reports sensation of continued urination after voiding. Symptoms have persisted for 2 weeks.  - On 2025, woke with a fever of 105°F, chills, body aches, vomiting, and pus in urine.  - Diagnosed with a UTI caused by E. coli at urgent care, symptoms suggested prostatitis.  - Reports occasional pelvic pressure and uses home testing strips.    Supplemental information: Daily fluid intake includes coffee and 30-40 ounces of water. History of kidney stones, no hematuria. Reduced caffeine intake to alleviate symptoms.    Subjective   Review of System: As noted in HPI.    Past Medical History:   Diagnosis Date    ADHD (attention deficit hyperactivity disorder)     Gout     Headache     Urinary tract infection      Past Surgical History:   Procedure Laterality Date    NASAL TURBINATE REDUCTION Bilateral     NASAL TURBINATE REDUCTION Bilateral 2017    VASECTOMY  2021     Family History   Problem Relation Age of Onset    Arthritis Mother     Diabetes Paternal Grandmother     Heart  "disease Maternal Grandfather     Kidney cancer Neg Hx     Prostate cancer Neg Hx      Social History     Socioeconomic History    Marital status:    Tobacco Use    Smoking status: Never     Passive exposure: Never    Smokeless tobacco: Never   Vaping Use    Vaping status: Never Used   Substance and Sexual Activity    Alcohol use: No    Drug use: Never    Sexual activity: Yes     Partners: Female     Birth control/protection: Vasectomy       Current Outpatient Medications:     levoFLOXacin (LEVAQUIN) 500 MG tablet, Take 1 tablet by mouth Daily., Disp: , Rfl:     tamsulosin (FLOMAX) 0.4 MG capsule 24 hr capsule, Take 1 capsule by mouth Daily., Disp: , Rfl:     oxybutynin XL (Ditropan XL) 10 MG 24 hr tablet, Take 1 tablet by mouth Daily As Needed (bladder spasm)., Disp: 30 tablet, Rfl: 1    No Known Allergies  Objective   Visit Vitals  /80   Pulse 87   Temp 98 °F (36.7 °C)   Ht 170.2 cm (67\")   Wt 92.5 kg (204 lb)   SpO2 97%   BMI 31.95 kg/m²      Body mass index is 31.95 kg/m².   Physical Exam  Vitals and nursing note reviewed.   Constitutional:       General: He is not in acute distress.     Appearance: Normal appearance. He is overweight. He is not ill-appearing.   HENT:      Head: Normocephalic and atraumatic.   Pulmonary:      Effort: Pulmonary effort is normal.   Skin:     General: Skin is warm and dry.   Neurological:      General: No focal deficit present.      Mental Status: He is alert and oriented to person, place, and time.   Psychiatric:         Mood and Affect: Mood normal.         Behavior: Behavior normal.      Labs  Lab Results   Component Value Date    COLORU Yellow 03/03/2025    CLARITYU Clear (A) 03/03/2025    SPECGRAV 1.025 03/03/2025    PHUR 7.0 03/03/2025    LEUKOCYTESUR Negative 03/03/2025    NITRITE Negative 03/03/2025    PROTEINPOCUA Trace (A) 03/03/2025    GLUCOSEUR Negative 03/03/2025    KETONESU Negative 03/03/2025    UROBILINOGEN 2.0 E.U./dL (A) 03/03/2025    BILIRUBINUR " Negative 03/03/2025    RBCUR Negative 03/03/2025      Lab Results   Component Value Date    WBCUA 6-10 (A) 02/02/2024    WBCUA 21-50 (A) 01/30/2024    RBCUA 0-2 02/02/2024    RBCUA 21-50 (A) 01/30/2024    BACTERIA Trace (A) 02/02/2024    BACTERIA 1+ (A) 01/30/2024    HYALCASTU None Seen 02/02/2024    HYALCASTU None Seen 01/30/2024    SQUAMEPIUA 0-2 02/02/2024    SQUAMEPIUA 3-6 (A) 01/30/2024      Urine Culture          2/9/2025    11:14   Urine Culture   Urine Culture Final report      Lab Results   Component Value Date    WBC 5.87 02/02/2024    HGB 13.7 02/02/2024    HCT 39.4 02/02/2024    MCV 83.5 02/02/2024    PLT 98 (L) 02/02/2024     Lab Results   Component Value Date    GLUCOSE 108 (H) 02/02/2024    CALCIUM 8.7 02/02/2024     02/02/2024    K 3.8 02/02/2024    CO2 22.5 02/02/2024     02/02/2024    BUN 9 02/02/2024    BUN 16 01/30/2024    CREATININE 1.01 02/02/2024    CREATININE 1.16 01/30/2024    EGFR 97.0 02/02/2024    EGFR 82.2 01/30/2024    BCR 8.9 02/02/2024    ANIONGAP 11.5 02/02/2024    ALT 54 (H) 02/02/2024    AST 31 02/02/2024     Lab Results   Component Value Date    HGBA1C 5.20 09/29/2023     Lab Results   Component Value Date    HXGH11ZT 37.3 08/19/2020    PTH 24 08/19/2020    URICACID 8.7 (H) 09/29/2023     Lab Results   Component Value Date    TESTFRE 7.2 (L) 09/29/2023    TESTFRE 10.0 08/19/2020       Radiographic Studies  No Images in the past 120 days found..    I have reviewed the above labs and imaging.     Assessment / Plan    Diagnoses and all orders for this visit:    1. History of nephrolithiasis (Primary)  -     CT Abdomen Pelvis Stone Protocol; Future    2. Bladder spasms  -     oxybutynin XL (Ditropan XL) 10 MG 24 hr tablet; Take 1 tablet by mouth Daily As Needed (bladder spasm).  Dispense: 30 tablet; Refill: 1    3. History of UTI  -     POC Urinalysis Dipstick, Automated       Assessment & Plan  1. Recurrent UTIs  - Urine is concentrated but normal, no nitrates, blood, or  leukocytes  - Prostatitis unlikely   - Previous CT scan showed no renal calculi 1 year ago.    - History of nephrolithiasis may have contributed to initial infection  - No hematuria  - Reduced caffeine intake  - Prescribed oxybutynin for bladder spasms, as needed  - Ordered CT scan to rule out renal calculi  - Advised to maintain a bladder diary for a week and increase fluid intake  - Discussed potential side effects of oxybutynin  - Advised against starting Flomax and Levaquin due to no presence of infection.         Return in about 6 years (around 3/3/2031) for f/u with Bailey to review CT imaging and medication efficacy check .    Bailey Cyr, MSN, APRN, FNP-C  [facility]

## 2025-03-11 ENCOUNTER — HOSPITAL ENCOUNTER (OUTPATIENT)
Dept: CT IMAGING | Facility: HOSPITAL | Age: 40
Discharge: HOME | End: 2025-03-11
Admitting: NURSE PRACTITIONER
Payer: COMMERCIAL

## 2025-03-11 DIAGNOSIS — Z87.442 HISTORY OF NEPHROLITHIASIS: ICD-10-CM

## 2025-03-11 PROCEDURE — 74176 CT ABD & PELVIS W/O CONTRAST: CPT
